# Patient Record
Sex: FEMALE | Race: WHITE | NOT HISPANIC OR LATINO | Employment: FULL TIME | URBAN - NONMETROPOLITAN AREA
[De-identification: names, ages, dates, MRNs, and addresses within clinical notes are randomized per-mention and may not be internally consistent; named-entity substitution may affect disease eponyms.]

---

## 2022-01-28 ENCOUNTER — OFFICE VISIT (OUTPATIENT)
Dept: CARDIOLOGY | Facility: CLINIC | Age: 52
End: 2022-01-28

## 2022-01-28 VITALS
HEIGHT: 67 IN | HEART RATE: 70 BPM | DIASTOLIC BLOOD PRESSURE: 80 MMHG | OXYGEN SATURATION: 97 % | BODY MASS INDEX: 28.09 KG/M2 | WEIGHT: 179 LBS | TEMPERATURE: 96.2 F | RESPIRATION RATE: 18 BRPM | SYSTOLIC BLOOD PRESSURE: 116 MMHG

## 2022-01-28 DIAGNOSIS — R07.89 CHEST PAIN, ATYPICAL: Primary | ICD-10-CM

## 2022-01-28 DIAGNOSIS — R01.1 HEART MURMUR: ICD-10-CM

## 2022-01-28 PROCEDURE — 93000 ELECTROCARDIOGRAM COMPLETE: CPT | Performed by: INTERNAL MEDICINE

## 2022-01-28 PROCEDURE — 99204 OFFICE O/P NEW MOD 45 MIN: CPT | Performed by: INTERNAL MEDICINE

## 2022-02-18 ENCOUNTER — OFFICE VISIT (OUTPATIENT)
Dept: CARDIOLOGY | Facility: CLINIC | Age: 52
End: 2022-02-18

## 2022-02-18 VITALS
WEIGHT: 173 LBS | OXYGEN SATURATION: 98 % | SYSTOLIC BLOOD PRESSURE: 104 MMHG | DIASTOLIC BLOOD PRESSURE: 70 MMHG | TEMPERATURE: 97.1 F | HEIGHT: 67 IN | HEART RATE: 65 BPM | RESPIRATION RATE: 15 BRPM | BODY MASS INDEX: 27.15 KG/M2

## 2022-02-18 DIAGNOSIS — R01.1 HEART MURMUR: ICD-10-CM

## 2022-02-18 DIAGNOSIS — R07.89 CHEST PAIN, ATYPICAL: Primary | ICD-10-CM

## 2022-02-18 PROCEDURE — 99213 OFFICE O/P EST LOW 20 MIN: CPT | Performed by: INTERNAL MEDICINE

## 2022-08-29 ENCOUNTER — TELEPHONE (OUTPATIENT)
Dept: FAMILY MEDICINE CLINIC | Facility: CLINIC | Age: 52
End: 2022-08-29

## 2022-09-01 ENCOUNTER — OFFICE VISIT (OUTPATIENT)
Dept: FAMILY MEDICINE CLINIC | Facility: CLINIC | Age: 52
End: 2022-09-01

## 2022-09-01 VITALS
OXYGEN SATURATION: 98 % | DIASTOLIC BLOOD PRESSURE: 78 MMHG | SYSTOLIC BLOOD PRESSURE: 120 MMHG | HEIGHT: 67 IN | RESPIRATION RATE: 15 BRPM | HEART RATE: 79 BPM | TEMPERATURE: 98 F | WEIGHT: 184.6 LBS | BODY MASS INDEX: 28.97 KG/M2

## 2022-09-01 DIAGNOSIS — D64.9 ANEMIA, UNSPECIFIED TYPE: ICD-10-CM

## 2022-09-01 DIAGNOSIS — Z12.11 SCREENING FOR COLON CANCER: ICD-10-CM

## 2022-09-01 DIAGNOSIS — Z79.899 HIGH RISK MEDICATION USE: ICD-10-CM

## 2022-09-01 DIAGNOSIS — E01.0 THYROMEGALY: ICD-10-CM

## 2022-09-01 DIAGNOSIS — R79.89 ELEVATED TSH: ICD-10-CM

## 2022-09-01 DIAGNOSIS — R53.83 FATIGUE, UNSPECIFIED TYPE: Primary | ICD-10-CM

## 2022-09-01 PROCEDURE — 99204 OFFICE O/P NEW MOD 45 MIN: CPT | Performed by: PHYSICIAN ASSISTANT

## 2022-09-01 RX ORDER — LEVOTHYROXINE SODIUM 137 UG/1
137 TABLET ORAL DAILY
COMMUNITY
Start: 2022-08-16 | End: 2022-10-06

## 2022-09-08 ENCOUNTER — LAB (OUTPATIENT)
Dept: FAMILY MEDICINE CLINIC | Facility: CLINIC | Age: 52
End: 2022-09-08

## 2022-09-08 DIAGNOSIS — R79.89 ELEVATED TSH: ICD-10-CM

## 2022-09-08 DIAGNOSIS — D64.9 ANEMIA, UNSPECIFIED TYPE: ICD-10-CM

## 2022-09-08 DIAGNOSIS — R53.83 FATIGUE, UNSPECIFIED TYPE: ICD-10-CM

## 2022-09-08 PROCEDURE — 36415 COLL VENOUS BLD VENIPUNCTURE: CPT | Performed by: PHYSICIAN ASSISTANT

## 2022-09-09 LAB
ALBUMIN SERPL-MCNC: 4.2 G/DL (ref 3.8–4.8)
ALBUMIN/GLOB SERPL: 1.8 {RATIO} (ref 1.2–2.2)
ALP SERPL-CCNC: 141 IU/L (ref 44–121)
ALT SERPL-CCNC: 21 IU/L (ref 0–32)
AST SERPL-CCNC: 19 IU/L (ref 0–40)
BASOPHILS # BLD AUTO: 0.1 X10E3/UL (ref 0–0.2)
BASOPHILS NFR BLD AUTO: 1 %
BILIRUB SERPL-MCNC: 0.3 MG/DL (ref 0–1.2)
BUN SERPL-MCNC: 20 MG/DL (ref 6–24)
BUN/CREAT SERPL: 27 (ref 9–23)
CALCIUM SERPL-MCNC: 9.7 MG/DL (ref 8.7–10.2)
CHLORIDE SERPL-SCNC: 104 MMOL/L (ref 96–106)
CO2 SERPL-SCNC: 23 MMOL/L (ref 20–29)
CREAT SERPL-MCNC: 0.73 MG/DL (ref 0.57–1)
EGFRCR-CYS SERPLBLD CKD-EPI 2021: 100 ML/MIN/1.73
EOSINOPHIL # BLD AUTO: 0.1 X10E3/UL (ref 0–0.4)
EOSINOPHIL NFR BLD AUTO: 2 %
ERYTHROCYTE [DISTWIDTH] IN BLOOD BY AUTOMATED COUNT: 12.9 % (ref 11.7–15.4)
FERRITIN SERPL-MCNC: 121 NG/ML (ref 15–150)
FOLATE SERPL-MCNC: 14.8 NG/ML
GLOBULIN SER CALC-MCNC: 2.3 G/DL (ref 1.5–4.5)
GLUCOSE SERPL-MCNC: 92 MG/DL (ref 65–99)
HCT VFR BLD AUTO: 36.5 % (ref 34–46.6)
HGB BLD-MCNC: 12.1 G/DL (ref 11.1–15.9)
IMM GRANULOCYTES # BLD AUTO: 0 X10E3/UL (ref 0–0.1)
IMM GRANULOCYTES NFR BLD AUTO: 0 %
IRON SATN MFR SERPL: 20 % (ref 15–55)
IRON SERPL-MCNC: 69 UG/DL (ref 27–159)
LYMPHOCYTES # BLD AUTO: 2.1 X10E3/UL (ref 0.7–3.1)
LYMPHOCYTES NFR BLD AUTO: 32 %
MAGNESIUM SERPL-MCNC: 2.2 MG/DL (ref 1.6–2.3)
MCH RBC QN AUTO: 29.6 PG (ref 26.6–33)
MCHC RBC AUTO-ENTMCNC: 33.2 G/DL (ref 31.5–35.7)
MCV RBC AUTO: 89 FL (ref 79–97)
MONOCYTES # BLD AUTO: 0.5 X10E3/UL (ref 0.1–0.9)
MONOCYTES NFR BLD AUTO: 8 %
NEUTROPHILS # BLD AUTO: 3.6 X10E3/UL (ref 1.4–7)
NEUTROPHILS NFR BLD AUTO: 57 %
PLATELET # BLD AUTO: 315 X10E3/UL (ref 150–450)
POTASSIUM SERPL-SCNC: 4.6 MMOL/L (ref 3.5–5.2)
PROT SERPL-MCNC: 6.5 G/DL (ref 6–8.5)
RBC # BLD AUTO: 4.09 X10E6/UL (ref 3.77–5.28)
SODIUM SERPL-SCNC: 142 MMOL/L (ref 134–144)
TIBC SERPL-MCNC: 342 UG/DL (ref 250–450)
UIBC SERPL-MCNC: 273 UG/DL (ref 131–425)
VIT B12 SERPL-MCNC: 448 PG/ML (ref 232–1245)
WBC # BLD AUTO: 6.4 X10E3/UL (ref 3.4–10.8)

## 2022-09-12 LAB
THYROGLOB AB SERPL-ACNC: <1 IU/ML (ref 0–0.9)
THYROPEROXIDASE AB SERPL-ACNC: 10 IU/ML (ref 0–34)

## 2022-10-06 ENCOUNTER — OFFICE VISIT (OUTPATIENT)
Dept: FAMILY MEDICINE CLINIC | Facility: CLINIC | Age: 52
End: 2022-10-06

## 2022-10-06 VITALS
HEIGHT: 67 IN | HEART RATE: 79 BPM | OXYGEN SATURATION: 99 % | DIASTOLIC BLOOD PRESSURE: 84 MMHG | SYSTOLIC BLOOD PRESSURE: 128 MMHG | BODY MASS INDEX: 28.94 KG/M2 | TEMPERATURE: 98 F | RESPIRATION RATE: 15 BRPM | WEIGHT: 184.4 LBS

## 2022-10-06 DIAGNOSIS — R53.83 OTHER FATIGUE: Primary | ICD-10-CM

## 2022-10-06 DIAGNOSIS — R63.5 WEIGHT GAIN: ICD-10-CM

## 2022-10-06 DIAGNOSIS — Z13.220 SCREENING FOR HYPERLIPIDEMIA: ICD-10-CM

## 2022-10-06 DIAGNOSIS — R79.89 ELEVATED TSH: ICD-10-CM

## 2022-10-06 PROCEDURE — 99214 OFFICE O/P EST MOD 30 MIN: CPT | Performed by: PHYSICIAN ASSISTANT

## 2022-10-06 RX ORDER — LEVOTHYROXINE SODIUM 0.05 MG/1
50 TABLET ORAL DAILY
Qty: 90 TABLET | Refills: 0 | Status: SHIPPED | OUTPATIENT
Start: 2022-10-06 | End: 2023-01-13 | Stop reason: SDUPTHER

## 2023-01-13 ENCOUNTER — OFFICE VISIT (OUTPATIENT)
Dept: FAMILY MEDICINE CLINIC | Facility: CLINIC | Age: 53
End: 2023-01-13
Payer: COMMERCIAL

## 2023-01-13 VITALS
BODY MASS INDEX: 26.85 KG/M2 | RESPIRATION RATE: 15 BRPM | TEMPERATURE: 98 F | HEIGHT: 67 IN | HEART RATE: 87 BPM | WEIGHT: 171.1 LBS | DIASTOLIC BLOOD PRESSURE: 88 MMHG | SYSTOLIC BLOOD PRESSURE: 118 MMHG | OXYGEN SATURATION: 98 %

## 2023-01-13 DIAGNOSIS — F33.42 RECURRENT MAJOR DEPRESSIVE DISORDER, IN FULL REMISSION: ICD-10-CM

## 2023-01-13 DIAGNOSIS — G47.62 NOCTURNAL LEG CRAMPS: ICD-10-CM

## 2023-01-13 DIAGNOSIS — E03.9 ACQUIRED HYPOTHYROIDISM: Primary | ICD-10-CM

## 2023-01-13 PROBLEM — M25.551 PAIN OF RIGHT HIP JOINT: Status: ACTIVE | Noted: 2022-03-15

## 2023-01-13 PROCEDURE — 99214 OFFICE O/P EST MOD 30 MIN: CPT | Performed by: PHYSICIAN ASSISTANT

## 2023-01-13 RX ORDER — LEVOTHYROXINE SODIUM 0.05 MG/1
50 TABLET ORAL DAILY
Qty: 90 TABLET | Refills: 1 | Status: SHIPPED | OUTPATIENT
Start: 2023-01-13

## 2023-01-13 RX ORDER — DICLOFENAC SODIUM 75 MG/1
75 TABLET, DELAYED RELEASE ORAL EVERY 12 HOURS PRN
COMMUNITY
Start: 2023-01-10 | End: 2023-01-13

## 2023-01-13 RX ORDER — FLUOXETINE HYDROCHLORIDE 20 MG/1
20 CAPSULE ORAL DAILY
Qty: 90 CAPSULE | Refills: 1 | Status: SHIPPED | OUTPATIENT
Start: 2023-01-13

## 2023-01-14 LAB
TSH SERPL DL<=0.005 MIU/L-ACNC: 1.6 UIU/ML (ref 0.45–4.5)
VIT B12 SERPL-MCNC: 405 PG/ML (ref 232–1245)

## 2023-07-20 PROBLEM — I10 BENIGN ESSENTIAL HTN: Status: ACTIVE | Noted: 2023-07-20

## 2023-07-20 PROBLEM — F41.8 ANXIETY WITH DEPRESSION: Status: ACTIVE | Noted: 2023-07-20

## 2023-07-20 PROBLEM — R25.2 LEG CRAMPS: Status: ACTIVE | Noted: 2023-07-20

## 2023-07-20 PROBLEM — E03.9 HYPOTHYROIDISM (ACQUIRED): Status: ACTIVE | Noted: 2023-07-20

## 2023-07-20 PROBLEM — Z13.21 ENCOUNTER FOR VITAMIN DEFICIENCY SCREENING: Status: ACTIVE | Noted: 2023-07-20

## 2023-08-09 ENCOUNTER — OFFICE VISIT (OUTPATIENT)
Dept: FAMILY MEDICINE CLINIC | Facility: CLINIC | Age: 53
End: 2023-08-09
Payer: COMMERCIAL

## 2023-08-09 VITALS
OXYGEN SATURATION: 97 % | SYSTOLIC BLOOD PRESSURE: 132 MMHG | HEIGHT: 67 IN | HEART RATE: 65 BPM | WEIGHT: 178 LBS | BODY MASS INDEX: 27.94 KG/M2 | DIASTOLIC BLOOD PRESSURE: 86 MMHG

## 2023-08-09 DIAGNOSIS — R82.90 NONSPECIFIC FINDING ON EXAMINATION OF URINE: ICD-10-CM

## 2023-08-09 DIAGNOSIS — R51.9 FREQUENT HEADACHES: ICD-10-CM

## 2023-08-09 DIAGNOSIS — D72.829 LEUKOCYTOSIS, UNSPECIFIED TYPE: ICD-10-CM

## 2023-08-09 DIAGNOSIS — E03.9 HYPOTHYROIDISM (ACQUIRED): ICD-10-CM

## 2023-08-09 DIAGNOSIS — E83.52 SERUM CALCIUM ELEVATED: ICD-10-CM

## 2023-08-09 DIAGNOSIS — R20.2 PARESTHESIA: ICD-10-CM

## 2023-08-09 DIAGNOSIS — K59.00 CONSTIPATION, UNSPECIFIED CONSTIPATION TYPE: Primary | ICD-10-CM

## 2023-08-09 LAB
BILIRUB BLD-MCNC: NEGATIVE MG/DL
CLARITY, POC: CLEAR
COLOR UR: YELLOW
EXPIRATION DATE: ABNORMAL
GLUCOSE UR STRIP-MCNC: NEGATIVE MG/DL
KETONES UR QL: NEGATIVE
LEUKOCYTE EST, POC: NEGATIVE
Lab: ABNORMAL
NITRITE UR-MCNC: NEGATIVE MG/ML
PH UR: 5.5 [PH] (ref 5–8)
PROT UR STRIP-MCNC: NEGATIVE MG/DL
RBC # UR STRIP: ABNORMAL /UL
SP GR UR: 1.02 (ref 1–1.03)
UROBILINOGEN UR QL: NORMAL

## 2023-08-09 RX ORDER — ONDANSETRON 4 MG/1
TABLET, ORALLY DISINTEGRATING ORAL
COMMUNITY

## 2023-08-10 LAB
ALBUMIN SERPL-MCNC: 4.4 G/DL (ref 3.8–4.9)
ALBUMIN/GLOB SERPL: 1.9 {RATIO} (ref 1.2–2.2)
ALP SERPL-CCNC: 96 IU/L (ref 44–121)
ALT SERPL-CCNC: 17 IU/L (ref 0–32)
AST SERPL-CCNC: 18 IU/L (ref 0–40)
BASOPHILS # BLD AUTO: 0.1 X10E3/UL (ref 0–0.2)
BASOPHILS NFR BLD AUTO: 1 %
BILIRUB SERPL-MCNC: 0.3 MG/DL (ref 0–1.2)
BUN SERPL-MCNC: 14 MG/DL (ref 6–24)
BUN/CREAT SERPL: 16 (ref 9–23)
CA-I SERPL ISE-MCNC: 5 MG/DL (ref 4.5–5.6)
CALCIUM SERPL-MCNC: 9.4 MG/DL (ref 8.7–10.2)
CHLORIDE SERPL-SCNC: 102 MMOL/L (ref 96–106)
CO2 SERPL-SCNC: 25 MMOL/L (ref 20–29)
CREAT SERPL-MCNC: 0.87 MG/DL (ref 0.57–1)
EGFRCR SERPLBLD CKD-EPI 2021: 81 ML/MIN/1.73
EOSINOPHIL # BLD AUTO: 0.1 X10E3/UL (ref 0–0.4)
EOSINOPHIL NFR BLD AUTO: 1 %
ERYTHROCYTE [DISTWIDTH] IN BLOOD BY AUTOMATED COUNT: 12.6 % (ref 11.7–15.4)
GLOBULIN SER CALC-MCNC: 2.3 G/DL (ref 1.5–4.5)
GLUCOSE SERPL-MCNC: 82 MG/DL (ref 70–99)
HCT VFR BLD AUTO: 36.2 % (ref 34–46.6)
HGB BLD-MCNC: 11.9 G/DL (ref 11.1–15.9)
IMM GRANULOCYTES # BLD AUTO: 0 X10E3/UL (ref 0–0.1)
IMM GRANULOCYTES NFR BLD AUTO: 0 %
LYMPHOCYTES # BLD AUTO: 2.3 X10E3/UL (ref 0.7–3.1)
LYMPHOCYTES NFR BLD AUTO: 32 %
MCH RBC QN AUTO: 29.3 PG (ref 26.6–33)
MCHC RBC AUTO-ENTMCNC: 32.9 G/DL (ref 31.5–35.7)
MCV RBC AUTO: 89 FL (ref 79–97)
MONOCYTES # BLD AUTO: 0.4 X10E3/UL (ref 0.1–0.9)
MONOCYTES NFR BLD AUTO: 6 %
NEUTROPHILS # BLD AUTO: 4.3 X10E3/UL (ref 1.4–7)
NEUTROPHILS NFR BLD AUTO: 60 %
PLATELET # BLD AUTO: 283 X10E3/UL (ref 150–450)
POTASSIUM SERPL-SCNC: 4.2 MMOL/L (ref 3.5–5.2)
PROT SERPL-MCNC: 6.7 G/DL (ref 6–8.5)
PTH-INTACT SERPL-MCNC: NORMAL PG/ML
RBC # BLD AUTO: 4.06 X10E6/UL (ref 3.77–5.28)
SODIUM SERPL-SCNC: 142 MMOL/L (ref 134–144)
SPECIMEN STATUS: NORMAL
TSH SERPL DL<=0.005 MIU/L-ACNC: 0.88 UIU/ML (ref 0.45–4.5)
VIT B12 SERPL-MCNC: 507 PG/ML (ref 232–1245)
WBC # BLD AUTO: 7.2 X10E3/UL (ref 3.4–10.8)

## 2023-08-11 LAB
BACTERIA UR CULT: NO GROWTH
BACTERIA UR CULT: NORMAL

## 2023-11-15 ENCOUNTER — OFFICE VISIT (OUTPATIENT)
Dept: NEUROLOGY | Facility: CLINIC | Age: 53
End: 2023-11-15
Payer: COMMERCIAL

## 2023-11-15 VITALS
SYSTOLIC BLOOD PRESSURE: 118 MMHG | WEIGHT: 177.8 LBS | OXYGEN SATURATION: 96 % | DIASTOLIC BLOOD PRESSURE: 68 MMHG | HEART RATE: 77 BPM | BODY MASS INDEX: 27.91 KG/M2 | HEIGHT: 67 IN

## 2023-11-15 DIAGNOSIS — G43.C0 PERIODIC HEADACHE SYNDROME, NOT INTRACTABLE: Primary | ICD-10-CM

## 2023-11-15 DIAGNOSIS — R20.2 PARESTHESIA: ICD-10-CM

## 2023-11-15 PROBLEM — R51.9 FREQUENT HEADACHES: Status: RESOLVED | Noted: 2023-08-09 | Resolved: 2023-11-15

## 2023-11-15 RX ORDER — AMITRIPTYLINE HYDROCHLORIDE 10 MG/1
10-20 TABLET, FILM COATED ORAL NIGHTLY
Qty: 60 TABLET | Refills: 5 | Status: SHIPPED | OUTPATIENT
Start: 2023-11-15

## 2023-12-29 ENCOUNTER — HOSPITAL ENCOUNTER (OUTPATIENT)
Dept: MRI IMAGING | Facility: HOSPITAL | Age: 53
Discharge: HOME | End: 2023-12-29
Admitting: PSYCHIATRY & NEUROLOGY
Payer: COMMERCIAL

## 2023-12-29 DIAGNOSIS — R20.2 PARESTHESIA: ICD-10-CM

## 2023-12-29 DIAGNOSIS — G43.C0 PERIODIC HEADACHE SYNDROME, NOT INTRACTABLE: ICD-10-CM

## 2023-12-29 PROCEDURE — 70553 MRI BRAIN STEM W/O & W/DYE: CPT

## 2023-12-29 PROCEDURE — 0 GADOBENATE DIMEGLUMINE 529 MG/ML SOLUTION: Performed by: PSYCHIATRY & NEUROLOGY

## 2023-12-29 PROCEDURE — A9577 INJ MULTIHANCE: HCPCS | Performed by: PSYCHIATRY & NEUROLOGY

## 2023-12-29 RX ADMIN — GADOBENATE DIMEGLUMINE 15 ML: 529 INJECTION, SOLUTION INTRAVENOUS at 09:36

## 2024-01-03 ENCOUNTER — TELEPHONE (OUTPATIENT)
Dept: NEUROLOGY | Facility: CLINIC | Age: 54
End: 2024-01-03
Payer: COMMERCIAL

## 2024-01-11 ENCOUNTER — OFFICE VISIT (OUTPATIENT)
Dept: FAMILY MEDICINE CLINIC | Facility: CLINIC | Age: 54
End: 2024-01-11
Payer: COMMERCIAL

## 2024-01-11 VITALS
DIASTOLIC BLOOD PRESSURE: 84 MMHG | HEIGHT: 67 IN | SYSTOLIC BLOOD PRESSURE: 122 MMHG | TEMPERATURE: 98.8 F | BODY MASS INDEX: 27.37 KG/M2 | HEART RATE: 76 BPM | OXYGEN SATURATION: 100 % | WEIGHT: 174.38 LBS

## 2024-01-11 DIAGNOSIS — J02.9 SORE THROAT: ICD-10-CM

## 2024-01-11 DIAGNOSIS — J01.00 ACUTE NON-RECURRENT MAXILLARY SINUSITIS: Primary | ICD-10-CM

## 2024-01-11 DIAGNOSIS — R05.1 ACUTE COUGH: ICD-10-CM

## 2024-01-11 PROBLEM — R05.9 COUGH: Status: ACTIVE | Noted: 2024-01-11

## 2024-01-11 LAB
EXPIRATION DATE: NORMAL
EXPIRATION DATE: NORMAL
FLUAV AG UPPER RESP QL IA.RAPID: NOT DETECTED
FLUBV AG UPPER RESP QL IA.RAPID: NOT DETECTED
INTERNAL CONTROL: NORMAL
INTERNAL CONTROL: NORMAL
Lab: NORMAL
Lab: NORMAL
S PYO AG THROAT QL: NEGATIVE
SARS-COV-2 AG UPPER RESP QL IA.RAPID: NOT DETECTED

## 2024-01-11 PROCEDURE — 99213 OFFICE O/P EST LOW 20 MIN: CPT | Performed by: NURSE PRACTITIONER

## 2024-01-11 RX ORDER — CEFDINIR 300 MG/1
300 CAPSULE ORAL 2 TIMES DAILY
Qty: 20 CAPSULE | Refills: 0 | Status: SHIPPED | OUTPATIENT
Start: 2024-01-11

## 2024-01-11 RX ORDER — ESTRADIOL 0.1 MG/G
CREAM VAGINAL
COMMUNITY
Start: 2023-12-02

## 2024-01-14 LAB
BACTERIA SPEC RESP CULT: NORMAL
BACTERIA SPEC RESP CULT: NORMAL

## 2024-01-18 DIAGNOSIS — E03.9 HYPOTHYROIDISM (ACQUIRED): ICD-10-CM

## 2024-01-18 RX ORDER — LEVOTHYROXINE SODIUM 0.05 MG/1
50 TABLET ORAL DAILY
Qty: 90 TABLET | Refills: 0 | Status: SHIPPED | OUTPATIENT
Start: 2024-01-18

## 2024-02-07 ENCOUNTER — OFFICE VISIT (OUTPATIENT)
Dept: FAMILY MEDICINE CLINIC | Facility: CLINIC | Age: 54
End: 2024-02-07
Payer: COMMERCIAL

## 2024-02-07 VITALS
WEIGHT: 181.44 LBS | SYSTOLIC BLOOD PRESSURE: 128 MMHG | HEIGHT: 67 IN | BODY MASS INDEX: 28.48 KG/M2 | OXYGEN SATURATION: 95 % | DIASTOLIC BLOOD PRESSURE: 82 MMHG | RESPIRATION RATE: 17 BRPM | HEART RATE: 71 BPM

## 2024-02-07 DIAGNOSIS — M54.2 NECK PAIN: Primary | ICD-10-CM

## 2024-02-07 PROCEDURE — 99214 OFFICE O/P EST MOD 30 MIN: CPT | Performed by: NURSE PRACTITIONER

## 2024-02-07 RX ORDER — DICLOFENAC SODIUM 75 MG/1
75 TABLET, DELAYED RELEASE ORAL 2 TIMES DAILY
Qty: 40 TABLET | Refills: 0 | Status: SHIPPED | OUTPATIENT
Start: 2024-02-07

## 2024-02-07 RX ORDER — TIZANIDINE 4 MG/1
4 TABLET ORAL EVERY 8 HOURS PRN
Qty: 30 TABLET | Refills: 0 | Status: SHIPPED | OUTPATIENT
Start: 2024-02-07

## 2024-03-25 ENCOUNTER — OFFICE VISIT (OUTPATIENT)
Dept: FAMILY MEDICINE CLINIC | Facility: CLINIC | Age: 54
End: 2024-03-25
Payer: COMMERCIAL

## 2024-03-25 ENCOUNTER — TELEPHONE (OUTPATIENT)
Dept: FAMILY MEDICINE CLINIC | Facility: CLINIC | Age: 54
End: 2024-03-25

## 2024-03-25 VITALS
HEIGHT: 67 IN | DIASTOLIC BLOOD PRESSURE: 84 MMHG | BODY MASS INDEX: 28.78 KG/M2 | WEIGHT: 183.38 LBS | HEART RATE: 69 BPM | SYSTOLIC BLOOD PRESSURE: 112 MMHG | OXYGEN SATURATION: 97 %

## 2024-03-25 DIAGNOSIS — Z11.59 NEED FOR HEPATITIS C SCREENING TEST: ICD-10-CM

## 2024-03-25 DIAGNOSIS — F41.8 ANXIETY WITH DEPRESSION: ICD-10-CM

## 2024-03-25 DIAGNOSIS — Z12.4 SCREENING FOR CERVICAL CANCER: ICD-10-CM

## 2024-03-25 DIAGNOSIS — Z12.11 SCREENING FOR COLON CANCER: ICD-10-CM

## 2024-03-25 DIAGNOSIS — R20.0 NUMBNESS AND TINGLING OF BOTH FEET: ICD-10-CM

## 2024-03-25 DIAGNOSIS — R20.2 NUMBNESS AND TINGLING OF BOTH FEET: ICD-10-CM

## 2024-03-25 DIAGNOSIS — Z13.220 SCREENING FOR HYPERLIPIDEMIA: ICD-10-CM

## 2024-03-25 DIAGNOSIS — I10 BENIGN ESSENTIAL HTN: ICD-10-CM

## 2024-03-25 DIAGNOSIS — G43.C0 PERIODIC HEADACHE SYNDROME, NOT INTRACTABLE: ICD-10-CM

## 2024-03-25 DIAGNOSIS — Z00.00 ANNUAL PHYSICAL EXAM: Primary | ICD-10-CM

## 2024-03-25 DIAGNOSIS — E03.9 HYPOTHYROIDISM (ACQUIRED): ICD-10-CM

## 2024-03-25 DIAGNOSIS — Z13.21 ENCOUNTER FOR VITAMIN DEFICIENCY SCREENING: ICD-10-CM

## 2024-03-25 DIAGNOSIS — Z12.31 ENCOUNTER FOR SCREENING MAMMOGRAM FOR MALIGNANT NEOPLASM OF BREAST: ICD-10-CM

## 2024-03-25 LAB
BILIRUB BLD-MCNC: NEGATIVE MG/DL
CLARITY, POC: CLEAR
COLOR UR: YELLOW
EXPIRATION DATE: NORMAL
GLUCOSE UR STRIP-MCNC: NEGATIVE MG/DL
KETONES UR QL: NEGATIVE
LEUKOCYTE EST, POC: NEGATIVE
Lab: NORMAL
NITRITE UR-MCNC: NEGATIVE MG/ML
PH UR: 5 [PH] (ref 5–8)
PROT UR STRIP-MCNC: NEGATIVE MG/DL
RBC # UR STRIP: NEGATIVE /UL
SP GR UR: 1.03 (ref 1–1.03)
UROBILINOGEN UR QL: NORMAL

## 2024-03-25 RX ORDER — LEVOTHYROXINE SODIUM 0.05 MG/1
50 TABLET ORAL DAILY
Qty: 90 TABLET | Refills: 1 | Status: SHIPPED | OUTPATIENT
Start: 2024-03-25

## 2024-03-25 RX ORDER — PREDNISONE 20 MG/1
TABLET ORAL
Qty: 18 TABLET | Refills: 0 | Status: SHIPPED | OUTPATIENT
Start: 2024-03-25 | End: 2024-04-03

## 2024-03-26 LAB
25(OH)D3+25(OH)D2 SERPL-MCNC: 35.8 NG/ML (ref 30–100)
ALBUMIN SERPL-MCNC: 4.2 G/DL (ref 3.8–4.9)
ALBUMIN/GLOB SERPL: 2 {RATIO} (ref 1.2–2.2)
ALP SERPL-CCNC: 130 IU/L (ref 44–121)
ALT SERPL-CCNC: 30 IU/L (ref 0–32)
AST SERPL-CCNC: 24 IU/L (ref 0–40)
BASOPHILS # BLD AUTO: 0.1 X10E3/UL (ref 0–0.2)
BASOPHILS NFR BLD AUTO: 1 %
BILIRUB SERPL-MCNC: 0.3 MG/DL (ref 0–1.2)
BUN SERPL-MCNC: 17 MG/DL (ref 6–24)
BUN/CREAT SERPL: 21 (ref 9–23)
CALCIUM SERPL-MCNC: 9.7 MG/DL (ref 8.7–10.2)
CHLORIDE SERPL-SCNC: 107 MMOL/L (ref 96–106)
CHOLEST SERPL-MCNC: 201 MG/DL (ref 100–199)
CO2 SERPL-SCNC: 24 MMOL/L (ref 20–29)
CREAT SERPL-MCNC: 0.8 MG/DL (ref 0.57–1)
EGFRCR SERPLBLD CKD-EPI 2021: 89 ML/MIN/1.73
EOSINOPHIL # BLD AUTO: 0.2 X10E3/UL (ref 0–0.4)
EOSINOPHIL NFR BLD AUTO: 3 %
ERYTHROCYTE [DISTWIDTH] IN BLOOD BY AUTOMATED COUNT: 12.8 % (ref 11.7–15.4)
FERRITIN SERPL-MCNC: 92 NG/ML (ref 15–150)
FOLATE SERPL-MCNC: >20 NG/ML
GLOBULIN SER CALC-MCNC: 2.1 G/DL (ref 1.5–4.5)
GLUCOSE SERPL-MCNC: 92 MG/DL (ref 70–99)
HBA1C MFR BLD: 5.6 % (ref 4.8–5.6)
HCT VFR BLD AUTO: 38 % (ref 34–46.6)
HCV IGG SERPL QL IA: REACTIVE
HDLC SERPL-MCNC: 61 MG/DL
HGB BLD-MCNC: 12.2 G/DL (ref 11.1–15.9)
IMM GRANULOCYTES # BLD AUTO: 0 X10E3/UL (ref 0–0.1)
IMM GRANULOCYTES NFR BLD AUTO: 0 %
IRON SERPL-MCNC: 58 UG/DL (ref 27–159)
LDLC SERPL CALC-MCNC: 113 MG/DL (ref 0–99)
LYMPHOCYTES # BLD AUTO: 2 X10E3/UL (ref 0.7–3.1)
LYMPHOCYTES NFR BLD AUTO: 32 %
MAGNESIUM SERPL-MCNC: 2.1 MG/DL (ref 1.6–2.3)
MCH RBC QN AUTO: 29.5 PG (ref 26.6–33)
MCHC RBC AUTO-ENTMCNC: 32.1 G/DL (ref 31.5–35.7)
MCV RBC AUTO: 92 FL (ref 79–97)
MONOCYTES # BLD AUTO: 0.4 X10E3/UL (ref 0.1–0.9)
MONOCYTES NFR BLD AUTO: 7 %
NEUTROPHILS # BLD AUTO: 3.7 X10E3/UL (ref 1.4–7)
NEUTROPHILS NFR BLD AUTO: 57 %
PLATELET # BLD AUTO: 272 X10E3/UL (ref 150–450)
POTASSIUM SERPL-SCNC: 4.2 MMOL/L (ref 3.5–5.2)
PROT SERPL-MCNC: 6.3 G/DL (ref 6–8.5)
RBC # BLD AUTO: 4.14 X10E6/UL (ref 3.77–5.28)
SODIUM SERPL-SCNC: 144 MMOL/L (ref 134–144)
TRIGL SERPL-MCNC: 155 MG/DL (ref 0–149)
TSH SERPL DL<=0.005 MIU/L-ACNC: 4.32 UIU/ML (ref 0.45–4.5)
VIT B12 SERPL-MCNC: 577 PG/ML (ref 232–1245)
VLDLC SERPL CALC-MCNC: 27 MG/DL (ref 5–40)
WBC # BLD AUTO: 6.4 X10E3/UL (ref 3.4–10.8)

## 2024-03-27 ENCOUNTER — TRANSCRIBE ORDERS (OUTPATIENT)
Dept: CT IMAGING | Facility: CLINIC | Age: 54
End: 2024-03-27
Payer: COMMERCIAL

## 2024-03-27 DIAGNOSIS — Z12.31 ENCOUNTER FOR SCREENING MAMMOGRAM FOR MALIGNANT NEOPLASM OF BREAST: Primary | ICD-10-CM

## 2024-03-28 DIAGNOSIS — R76.8 POSITIVE HEPATITIS C ANTIBODY TEST: Primary | ICD-10-CM

## 2024-03-28 DIAGNOSIS — R74.8 ELEVATED ALKALINE PHOSPHATASE LEVEL: ICD-10-CM

## 2024-03-30 LAB
HCV RNA SERPL NAA+PROBE-ACNC: NORMAL IU/ML
TEST INFORMATION: NORMAL

## 2024-04-03 ENCOUNTER — TELEPHONE (OUTPATIENT)
Dept: FAMILY MEDICINE CLINIC | Facility: CLINIC | Age: 54
End: 2024-04-03
Payer: COMMERCIAL

## 2024-04-12 ENCOUNTER — TELEPHONE (OUTPATIENT)
Dept: FAMILY MEDICINE CLINIC | Facility: CLINIC | Age: 54
End: 2024-04-12
Payer: COMMERCIAL

## 2024-04-15 ENCOUNTER — OFFICE VISIT (OUTPATIENT)
Dept: FAMILY MEDICINE CLINIC | Facility: CLINIC | Age: 54
End: 2024-04-15
Payer: COMMERCIAL

## 2024-04-15 VITALS
WEIGHT: 187.19 LBS | OXYGEN SATURATION: 97 % | HEIGHT: 67 IN | DIASTOLIC BLOOD PRESSURE: 78 MMHG | SYSTOLIC BLOOD PRESSURE: 110 MMHG | BODY MASS INDEX: 29.38 KG/M2 | HEART RATE: 75 BPM

## 2024-04-15 DIAGNOSIS — R20.2 NUMBNESS AND TINGLING OF BOTH FEET: Primary | ICD-10-CM

## 2024-04-15 DIAGNOSIS — R20.0 NUMBNESS AND TINGLING OF BOTH FEET: Primary | ICD-10-CM

## 2024-04-15 PROCEDURE — 99213 OFFICE O/P EST LOW 20 MIN: CPT | Performed by: NURSE PRACTITIONER

## 2024-06-14 ENCOUNTER — OFFICE VISIT (OUTPATIENT)
Dept: GASTROENTEROLOGY | Facility: CLINIC | Age: 54
End: 2024-06-14
Payer: COMMERCIAL

## 2024-06-14 VITALS
BODY MASS INDEX: 29.03 KG/M2 | DIASTOLIC BLOOD PRESSURE: 72 MMHG | OXYGEN SATURATION: 98 % | SYSTOLIC BLOOD PRESSURE: 120 MMHG | HEART RATE: 73 BPM | HEIGHT: 67 IN | WEIGHT: 185 LBS | TEMPERATURE: 97.1 F

## 2024-06-14 DIAGNOSIS — R76.8 HEPATITIS C ANTIBODY TEST POSITIVE: Primary | ICD-10-CM

## 2024-06-14 RX ORDER — NAPROXEN 500 MG/1
500 TABLET ORAL 2 TIMES DAILY WITH MEALS
COMMUNITY

## 2024-08-27 DIAGNOSIS — E03.9 HYPOTHYROIDISM (ACQUIRED): ICD-10-CM

## 2024-08-27 RX ORDER — LEVOTHYROXINE SODIUM 50 UG/1
50 TABLET ORAL DAILY
Qty: 90 TABLET | Refills: 0 | Status: SHIPPED | OUTPATIENT
Start: 2024-08-27

## 2024-09-25 ENCOUNTER — TELEPHONE (OUTPATIENT)
Dept: FAMILY MEDICINE CLINIC | Facility: CLINIC | Age: 54
End: 2024-09-25

## 2024-09-25 ENCOUNTER — OFFICE VISIT (OUTPATIENT)
Dept: FAMILY MEDICINE CLINIC | Facility: CLINIC | Age: 54
End: 2024-09-25
Payer: COMMERCIAL

## 2024-09-25 VITALS
OXYGEN SATURATION: 97 % | HEIGHT: 67 IN | HEART RATE: 75 BPM | DIASTOLIC BLOOD PRESSURE: 80 MMHG | WEIGHT: 186.06 LBS | BODY MASS INDEX: 29.2 KG/M2 | SYSTOLIC BLOOD PRESSURE: 132 MMHG

## 2024-09-25 DIAGNOSIS — Z79.899 ENCOUNTER FOR LONG-TERM (CURRENT) USE OF OTHER MEDICATIONS: ICD-10-CM

## 2024-09-25 DIAGNOSIS — F41.8 ANXIETY WITH DEPRESSION: ICD-10-CM

## 2024-09-25 DIAGNOSIS — E78.2 MIXED HYPERLIPIDEMIA: ICD-10-CM

## 2024-09-25 DIAGNOSIS — E03.9 HYPOTHYROIDISM (ACQUIRED): Primary | ICD-10-CM

## 2024-09-25 DIAGNOSIS — Z12.31 ENCOUNTER FOR SCREENING MAMMOGRAM FOR MALIGNANT NEOPLASM OF BREAST: ICD-10-CM

## 2024-09-25 PROCEDURE — 99214 OFFICE O/P EST MOD 30 MIN: CPT | Performed by: NURSE PRACTITIONER

## 2024-09-25 RX ORDER — LEVOTHYROXINE SODIUM 50 UG/1
50 TABLET ORAL DAILY
Qty: 90 TABLET | Refills: 1 | Status: SHIPPED | OUTPATIENT
Start: 2024-09-25 | End: 2024-09-30

## 2024-09-26 DIAGNOSIS — Z12.31 ENCOUNTER FOR SCREENING MAMMOGRAM FOR MALIGNANT NEOPLASM OF BREAST: ICD-10-CM

## 2024-09-26 LAB
ALBUMIN SERPL-MCNC: 4.1 G/DL (ref 3.8–4.9)
ALP SERPL-CCNC: 147 IU/L (ref 44–121)
ALT SERPL-CCNC: 33 IU/L (ref 0–32)
AST SERPL-CCNC: 30 IU/L (ref 0–40)
BILIRUB SERPL-MCNC: <0.2 MG/DL (ref 0–1.2)
BUN SERPL-MCNC: 20 MG/DL (ref 6–24)
BUN/CREAT SERPL: 24 (ref 9–23)
CALCIUM SERPL-MCNC: 9.2 MG/DL (ref 8.7–10.2)
CHLORIDE SERPL-SCNC: 102 MMOL/L (ref 96–106)
CHOLEST SERPL-MCNC: 219 MG/DL (ref 100–199)
CO2 SERPL-SCNC: 24 MMOL/L (ref 20–29)
CREAT SERPL-MCNC: 0.84 MG/DL (ref 0.57–1)
EGFRCR SERPLBLD CKD-EPI 2021: 84 ML/MIN/1.73
GLOBULIN SER CALC-MCNC: 2.2 G/DL (ref 1.5–4.5)
GLUCOSE SERPL-MCNC: 86 MG/DL (ref 70–99)
HDLC SERPL-MCNC: 46 MG/DL
LDLC SERPL CALC-MCNC: 95 MG/DL (ref 0–99)
POTASSIUM SERPL-SCNC: 4.1 MMOL/L (ref 3.5–5.2)
PROT SERPL-MCNC: 6.3 G/DL (ref 6–8.5)
SODIUM SERPL-SCNC: 141 MMOL/L (ref 134–144)
T4 FREE SERPL-MCNC: 1.09 NG/DL (ref 0.82–1.77)
TRIGL SERPL-MCNC: 473 MG/DL (ref 0–149)
TSH SERPL DL<=0.005 MIU/L-ACNC: 0.21 UIU/ML (ref 0.45–4.5)
VLDLC SERPL CALC-MCNC: 78 MG/DL (ref 5–40)

## 2024-09-30 RX ORDER — LEVOTHYROXINE SODIUM 25 UG/1
25 TABLET ORAL
Qty: 30 TABLET | Refills: 1 | Status: SHIPPED | OUTPATIENT
Start: 2024-09-30

## 2024-11-13 DIAGNOSIS — R79.89 LOW TSH LEVEL: ICD-10-CM

## 2024-11-13 DIAGNOSIS — R74.8 ELEVATED LIVER ENZYMES: Primary | ICD-10-CM

## 2024-11-14 ENCOUNTER — LAB (OUTPATIENT)
Dept: FAMILY MEDICINE CLINIC | Facility: CLINIC | Age: 54
End: 2024-11-14
Payer: COMMERCIAL

## 2024-11-15 LAB
ALBUMIN SERPL-MCNC: 4.4 G/DL (ref 3.8–4.9)
ALP SERPL-CCNC: 120 IU/L (ref 44–121)
ALT SERPL-CCNC: 18 IU/L (ref 0–32)
AST SERPL-CCNC: 23 IU/L (ref 0–40)
BILIRUB SERPL-MCNC: 0.4 MG/DL (ref 0–1.2)
BUN SERPL-MCNC: 19 MG/DL (ref 6–24)
BUN/CREAT SERPL: 25 (ref 9–23)
CALCIUM SERPL-MCNC: 9.6 MG/DL (ref 8.7–10.2)
CHLORIDE SERPL-SCNC: 103 MMOL/L (ref 96–106)
CO2 SERPL-SCNC: 28 MMOL/L (ref 20–29)
CREAT SERPL-MCNC: 0.75 MG/DL (ref 0.57–1)
EGFRCR SERPLBLD CKD-EPI 2021: 96 ML/MIN/1.73
GLOBULIN SER CALC-MCNC: 2.2 G/DL (ref 1.5–4.5)
GLUCOSE SERPL-MCNC: 95 MG/DL (ref 70–99)
POTASSIUM SERPL-SCNC: 4.7 MMOL/L (ref 3.5–5.2)
PROT SERPL-MCNC: 6.6 G/DL (ref 6–8.5)
SODIUM SERPL-SCNC: 143 MMOL/L (ref 134–144)
TSH SERPL DL<=0.005 MIU/L-ACNC: 3.87 UIU/ML (ref 0.45–4.5)

## 2024-11-19 ENCOUNTER — TELEPHONE (OUTPATIENT)
Dept: FAMILY MEDICINE CLINIC | Facility: CLINIC | Age: 54
End: 2024-11-19
Payer: COMMERCIAL

## 2024-11-27 RX ORDER — LEVOTHYROXINE SODIUM 25 UG/1
25 TABLET ORAL EVERY MORNING
Qty: 30 TABLET | Refills: 0 | Status: SHIPPED | OUTPATIENT
Start: 2024-11-27

## 2024-12-03 ENCOUNTER — TRANSCRIBE ORDERS (OUTPATIENT)
Dept: CT IMAGING | Facility: CLINIC | Age: 54
End: 2024-12-03
Payer: COMMERCIAL

## 2024-12-03 DIAGNOSIS — Z12.31 ENCOUNTER FOR SCREENING MAMMOGRAM FOR MALIGNANT NEOPLASM OF BREAST: Primary | ICD-10-CM

## 2024-12-26 RX ORDER — LEVOTHYROXINE SODIUM 25 UG/1
25 TABLET ORAL EVERY MORNING
Qty: 30 TABLET | Refills: 0 | Status: SHIPPED | OUTPATIENT
Start: 2024-12-26

## 2025-02-18 RX ORDER — LEVOTHYROXINE SODIUM 25 UG/1
25 TABLET ORAL EVERY MORNING
Qty: 30 TABLET | Refills: 0 | Status: SHIPPED | OUTPATIENT
Start: 2025-02-18

## 2025-04-03 ENCOUNTER — OFFICE VISIT (OUTPATIENT)
Dept: FAMILY MEDICINE CLINIC | Facility: CLINIC | Age: 55
End: 2025-04-03
Payer: COMMERCIAL

## 2025-04-03 ENCOUNTER — TELEPHONE (OUTPATIENT)
Dept: CARDIOLOGY | Facility: CLINIC | Age: 55
End: 2025-04-03
Payer: COMMERCIAL

## 2025-04-03 VITALS
BODY MASS INDEX: 29.18 KG/M2 | RESPIRATION RATE: 18 BRPM | WEIGHT: 185.9 LBS | HEIGHT: 67 IN | SYSTOLIC BLOOD PRESSURE: 134 MMHG | OXYGEN SATURATION: 97 % | HEART RATE: 66 BPM | DIASTOLIC BLOOD PRESSURE: 86 MMHG

## 2025-04-03 DIAGNOSIS — R07.9 CHEST PAIN, UNSPECIFIED TYPE: Primary | ICD-10-CM

## 2025-04-03 DIAGNOSIS — E87.0 HYPERNATREMIA: ICD-10-CM

## 2025-04-03 DIAGNOSIS — R21 RASH: ICD-10-CM

## 2025-04-03 DIAGNOSIS — D64.9 ANEMIA, UNSPECIFIED TYPE: ICD-10-CM

## 2025-04-03 DIAGNOSIS — E78.2 MIXED HYPERLIPIDEMIA: ICD-10-CM

## 2025-04-03 DIAGNOSIS — R31.9 HEMATURIA, UNSPECIFIED TYPE: ICD-10-CM

## 2025-04-03 DIAGNOSIS — M54.6 THORACIC SPINE PAIN: ICD-10-CM

## 2025-04-03 DIAGNOSIS — E03.9 HYPOTHYROIDISM (ACQUIRED): ICD-10-CM

## 2025-04-03 DIAGNOSIS — I10 BENIGN ESSENTIAL HTN: ICD-10-CM

## 2025-04-03 LAB
BILIRUB BLD-MCNC: NEGATIVE MG/DL
CLARITY, POC: CLEAR
COLOR UR: YELLOW
EXPIRATION DATE: ABNORMAL
GLUCOSE UR STRIP-MCNC: NEGATIVE MG/DL
KETONES UR QL: NEGATIVE
LEUKOCYTE EST, POC: NEGATIVE
Lab: ABNORMAL
NITRITE UR-MCNC: NEGATIVE MG/ML
PH UR: 5.5 [PH] (ref 5–8)
PROT UR STRIP-MCNC: NEGATIVE MG/DL
RBC # UR STRIP: ABNORMAL /UL
SP GR UR: 1.02 (ref 1–1.03)
UROBILINOGEN UR QL: ABNORMAL

## 2025-04-03 PROCEDURE — 99214 OFFICE O/P EST MOD 30 MIN: CPT | Performed by: NURSE PRACTITIONER

## 2025-04-03 PROCEDURE — 81003 URINALYSIS AUTO W/O SCOPE: CPT | Performed by: NURSE PRACTITIONER

## 2025-04-03 RX ORDER — LEVOTHYROXINE SODIUM 25 UG/1
25 TABLET ORAL EVERY MORNING
Qty: 30 TABLET | Refills: 5 | Status: SHIPPED | OUTPATIENT
Start: 2025-04-03

## 2025-04-03 RX ORDER — PREDNISONE 20 MG/1
TABLET ORAL
Qty: 18 TABLET | Refills: 0 | Status: SHIPPED | OUTPATIENT
Start: 2025-04-03 | End: 2025-04-12

## 2025-04-03 RX ORDER — VALACYCLOVIR HYDROCHLORIDE 1 G/1
1000 TABLET, FILM COATED ORAL 3 TIMES DAILY
Qty: 21 TABLET | Refills: 0 | Status: SHIPPED | OUTPATIENT
Start: 2025-04-03

## 2025-04-03 NOTE — ASSESSMENT & PLAN NOTE
X-ray completed.  Will let know if radiologist sees anything.  Due to location of pain and tender to palpation possibly related to a musculoskeletal issue.  Will treat with a round of prednisone.  Avoid NSAIDs while on prednisone.  Deep breaths encouraged.  Rest and ice in 15-minute intervals encouraged.  Return in 2 to 3 days if no improvement, sooner if worsens.  Risk of meds discussed and understood.  Return to clinic or ED with any issues or concerns.

## 2025-04-03 NOTE — PROGRESS NOTES
Chief Complaint  ER FOLLOW UP    Subjective          Michelle Perez presents to [facility]  History of Present Illness  History of Present Illness  The patient is a 55-year-old female who went to [facility] on 03/31/2025 due to dull aching chest pain that she had been experiencing for 3 days. The pain was located in the center of her chest and worsened when lying down. She occasionally experienced sharp, stabbing pains and at times, the pain radiated to her left arm. She reports no dizziness, headache, or lightheadedness. She has a history of hypertension and noted that her blood pressure had been elevated. She also has a history of hypothyroidism. She states she is doing well on her medications.     She initially sought care at an urgent care facility due to persistent back pain, which she suspected might be pleurisy. However, she was advised to seek emergency care. Upon arrival at the hospital, her blood pressure was recorded as 180/117. She has no known cardiac history. She recalls a previous echocardiogram revealing a minor valve leak, but she was reassured that it was not a cause for concern. She underwent EKGs, and the repeat EKG showed normal sinus rhythm with no acute ischemic changes or STEMI. Initial and repeat troponin levels were not elevated or significantly different. A negative D-dimer test led to the conclusion that ACS or PE was unlikely. A chest x-ray showed no acute abnormality (per pt. Report), and there were no significant lab abnormalities. Her symptoms improved with Pepcid and a stomach cocktail, leading to the possibility that the symptoms were GERD-related. She was discharged with omeprazole 20 mg twice daily and sucralfate.    She has not observed any hematochezia or melena. She is currently taking Prozac and thyroid medication without any missed doses.    She states she is continuing to have the pain.  She speculates that her current  "symptoms may be related to shingles, given the presence of a rash that is pruritic and mildly burning that has presented to her upper left lateral side. The onset of her symptoms was on Saturday, initially attributed to a pulled muscle from heavy lifting at work. Despite taking a muscle relaxer, she experienced insomnia and difficulty lying flat. Her symptoms have not improved and are exacerbated by lying down, necessitating a seated position for comfort.  She states the pain mainly occurs more so upper left side of her chest and upper left side of back near shoulder blade area.  She states she does not believe it is cardiac related and believes it is due to either a strained muscle or possibly this new rash that has occurred on her left side.  No chest pressure no shortness of breath no trouble breathing no urinary or bowel issues.  No swelling.  No altered mental status no confusion.  No headaches no dizziness.  No vision issues.    FAMILY HISTORY  Her father  from a heart issue, and her mother also had heart issues. All her aunts have heart issues.    MEDICATIONS  Current: Prozac, thyroid medicine, omeprazole, sucralfate    Patient Care Team:  Av Escamilla APRN as PCP - General (Nurse Practitioner)     Objective   Vital Signs:   /86   Pulse 66   Resp 18   Ht 170.2 cm (67.01\")   Wt 84.3 kg (185 lb 14.4 oz)   SpO2 97%   BMI 29.11 kg/m²     Body mass index is 29.11 kg/m².    Review of Systems   Constitutional:  Negative for chills, fatigue and fever.   HENT:  Negative for congestion.    Eyes:  Negative for visual disturbance.   Respiratory:  Negative for cough, chest tightness, shortness of breath and wheezing.    Cardiovascular:  Positive for chest pain. Negative for palpitations and leg swelling.   Gastrointestinal:  Negative for abdominal distention, abdominal pain, blood in stool, constipation, diarrhea, nausea, vomiting, GERD and indigestion.   Genitourinary:  Negative for decreased " urine volume, dysuria, frequency, hematuria and urgency.   Musculoskeletal:  Negative for back pain.   Skin:  Negative for rash, skin lesions and wound.   Neurological:  Negative for dizziness, weakness, light-headedness, numbness and headache.   Psychiatric/Behavioral:  Negative for suicidal ideas.        Past History:  Medical History: has a past medical history of Abdominal pain, Adjustment disorder with depressed mood, COVID-19 (03/2022), COVID-19 (12/2020), COVID-19 (07/2022), DDD (degenerative disc disease), cervical, Difficulty walking, Disc displacement, lumbar, HPV (human papilloma virus) infection, Migraine, Mixed hyperlipidemia (9/25/2024), and Tonsillitis.   Surgical History: has a past surgical history that includes Tonsillectomy (2005); Total abdominal hysterectomy; Back surgery; Cervical fusion (2009); and Appendectomy (2004).   Family History: family history includes Alcohol abuse in her mother; Cervical cancer in her sister; Coronary artery disease in an other family member; Dementia in her brother; Depression in her mother and sister; Diabetes in her brother and paternal grandmother; Hypertension in her father; Irritable bowel syndrome in her mother; Other in her sister; Stroke in her mother.   Social History: reports that she has never smoked. She has never been exposed to tobacco smoke. She has never used smokeless tobacco. She reports that she does not currently use alcohol. She reports that she does not use drugs.    PHQ-2 Depression Screening  Little interest or pleasure in doing things? Several days   Feeling down, depressed, or hopeless? Not at all   PHQ-2 Total Score 1       PHQ-9 Depression Screening  Little interest or pleasure in doing things? Several days   Feeling down, depressed, or hopeless? Not at all   PHQ-2 Total Score 1   Trouble falling or staying asleep, or sleeping too much?     Feeling tired or having little energy?     Poor appetite or overeating?     Feeling bad about  yourself - or that you are a failure or have let yourself or your family down?     Trouble concentrating on things, such as reading the newspaper or watching television?     Moving or speaking so slowly that other people could have noticed? Or the opposite - being so fidgety or restless that you have been moving around a lot more than usual?     Thoughts that you would be better off dead, or of hurting yourself in some way?     PHQ-9 Total Score     If you checked off any problems, how difficult have these problems made it for you to do your work, take care of things at home, or get along with other people? Not difficult at all        PHQ-9 Total Score:        Patient screened positive for depression based on a PHQ-9 score of  on . Follow-up recommendations include:          Current Outpatient Medications:     B Complex Vitamins (vitamin b complex) capsule capsule, Take  by mouth Daily. OTC, Disp: , Rfl:     FLUoxetine (PROzac) 20 MG capsule, Take 1 capsule by mouth Daily., Disp: 90 capsule, Rfl: 1    levothyroxine (SYNTHROID, LEVOTHROID) 25 MCG tablet, Take 1 tablet by mouth Every Morning., Disp: 30 tablet, Rfl: 5    predniSONE (DELTASONE) 20 MG tablet, Take 3 tablets by mouth Daily for 3 days, THEN 2 tablets Daily for 3 days, THEN 1 tablet Daily for 3 days., Disp: 18 tablet, Rfl: 0    valACYclovir (Valtrex) 1000 MG tablet, Take 1 tablet by mouth 3 (Three) Times a Day., Disp: 21 tablet, Rfl: 0   (Not in a hospital admission)     Allergies: Hydrocodone-acetaminophen and Wellbutrin [bupropion]    Physical Exam  Constitutional:       Appearance: Normal appearance.   Eyes:      Conjunctiva/sclera: Conjunctivae normal.      Pupils: Pupils are equal, round, and reactive to light.   Cardiovascular:      Rate and Rhythm: Normal rate and regular rhythm.      Heart sounds: Normal heart sounds.   Pulmonary:      Effort: Pulmonary effort is normal. No respiratory distress.      Breath sounds: Normal breath sounds. No wheezing,  rhonchi or rales.   Abdominal:      General: Abdomen is flat. Bowel sounds are normal. There is no distension.      Palpations: Abdomen is soft.      Tenderness: There is no abdominal tenderness. There is no right CVA tenderness, left CVA tenderness, guarding or rebound.   Musculoskeletal:      Left shoulder: Normal.      Cervical back: Normal.      Thoracic back: Tenderness present.        Back:       Right lower leg: No edema.      Left lower leg: No edema.      Comments: Patient states left area near shoulder blade as marked above slightly tender to palpation.   Skin:     General: Skin is warm.      Findings: Rash present.      Comments: Erythematous type linear rash present to upper left lateral side.  Slightly tender.  Patient states the rash itches and burns.  No open skin no discharge.   Neurological:      General: No focal deficit present.      Mental Status: She is alert and oriented to person, place, and time. Mental status is at baseline.   Psychiatric:         Mood and Affect: Mood normal.         Behavior: Behavior normal.         Thought Content: Thought content normal.         Judgment: Judgment normal.        Physical Exam  Throat was examined.  Lungs were auscultated.  Heart sounds were normal.  Rash was present.    Result Review :          Results  Laboratory Studies  Initial troponin was not elevated. Repeat troponin not elevated or significantly different. Negative D-dimer. Sodium was mildly elevated at 144. Red blood cell count low at 3.98. Hemoglobin and hematocrit were low at 11.6 and 35.5 respectively.    Testing  Repeat EKG showed normal sinus rhythm with no acute ischemic changes. No STEMI.             Assessment and Plan    Diagnoses and all orders for this visit:    1. Chest pain, unspecified type (Primary)  -     Basic Metabolic Panel  -     CBC & Differential  -     POC Urinalysis Dipstick, Automated  -     Ambulatory Referral to Cardiology    2. Benign essential HTN    3. Thoracic  spine pain  Assessment & Plan:  X-ray completed.  Will let know if radiologist sees anything.  Due to location of pain and tender to palpation possibly related to a musculoskeletal issue.  Will treat with a round of prednisone.  Avoid NSAIDs while on prednisone.  Deep breaths encouraged.  Rest and ice in 15-minute intervals encouraged.  Return in 2 to 3 days if no improvement, sooner if worsens.  Risk of meds discussed and understood.  Return to clinic or ED with any issues or concerns.    Orders:  -     predniSONE (DELTASONE) 20 MG tablet; Take 3 tablets by mouth Daily for 3 days, THEN 2 tablets Daily for 3 days, THEN 1 tablet Daily for 3 days.  Dispense: 18 tablet; Refill: 0  -     XR Spine Thoracic 2 View (In Office)    4. Rash  -     predniSONE (DELTASONE) 20 MG tablet; Take 3 tablets by mouth Daily for 3 days, THEN 2 tablets Daily for 3 days, THEN 1 tablet Daily for 3 days.  Dispense: 18 tablet; Refill: 0  -     valACYclovir (Valtrex) 1000 MG tablet; Take 1 tablet by mouth 3 (Three) Times a Day.  Dispense: 21 tablet; Refill: 0    5. Hypernatremia  -     Basic Metabolic Panel    6. Anemia, unspecified type  -     CBC & Differential  -     POC Urinalysis Dipstick, Automated    7. Mixed hyperlipidemia  -     Lipid Panel    8. Hypothyroidism (acquired)  -     TSH Rfx On Abnormal To Free T4  -     levothyroxine (SYNTHROID, LEVOTHROID) 25 MCG tablet; Take 1 tablet by mouth Every Morning.  Dispense: 30 tablet; Refill: 5    9. Hematuria, unspecified type  -     Cancel: Urine Culture - Urine, Urine, Clean Catch; Future  -     Urine Culture - Urine, Urine, Clean Catch      Assessment & Plan  1. Chest pain.  The chest pain could potentially be attributed to shingles or a cardiac issue that may not have been detected on the EKG. A referral to the cardiology team in [place] will be made for further cardiac evaluation, including an echocardiogram.  Labs drawn.  Education provided.  Deep breaths encouraged.  Informed her if  anything worsens that she needs to immediately go to the hospital.  Return to clinic or ED with any issues or concerns.    2. Suspected shingles.  The rash appears linear, suggesting it could be shingles contributing to the chest pain. An antiviral medication and steroids will be prescribed to manage the potential shingles and reduce inflammation.  Education provided on how to prevent spread.  Return in 2 days if no improvement, sooner if worsens.  Risk of meds discussed and understood.    3. Hypertension.  She has a history of hypertension and reported elevated blood pressure during her recent ER visit. Blood pressure will be monitored, and current medications will be reviewed.  Goal blood pressure less than 140/90.  Completed and shows blood.  Culture sent.  Call in 2 days for results.  Return in 1 to 2 weeks for repeat UA to make sure course.  UA let me know if gets to or above that.  Cardiology referral placed.    4. Hypothyroidism.  Continue levothyroxine. Thyroid levels will be rechecked today to determine if any adjustments are needed in her medication.    5. Anemia.  Her red blood cell count, hemoglobin, and hematocrit were low during her recent hospital visit. Blood counts will be rechecked today to monitor her anemia.  Patient states no source of blood loss.    6. Elevated sodium levels.  Her sodium levels were mildly elevated, possibly due to dehydration. Hydration status will be assessed, and sodium levels will be rechecked.  Labs drawn.  Stay hydrated with water.  Education provided.    PROCEDURE  The patient has a history of total hysterectomy.                    Follow Up   Return in about 3 months (around 7/3/2025), or if symptoms worsen or fail to improve, for Annual physical.  Patient was given instructions and counseling regarding her condition or for health maintenance advice. Please see specific information pulled into the AVS if appropriate.     Patient or patient representative verbalized  consent for the use of Ambient Listening during the visit with  HIPOLITO Benitez for chart documentation. 4/3/2025  12:14 EDT    HIPOLITO Benitez

## 2025-04-04 ENCOUNTER — OFFICE VISIT (OUTPATIENT)
Dept: CARDIOLOGY | Facility: CLINIC | Age: 55
End: 2025-04-04
Payer: COMMERCIAL

## 2025-04-04 VITALS
HEART RATE: 58 BPM | SYSTOLIC BLOOD PRESSURE: 132 MMHG | RESPIRATION RATE: 18 BRPM | BODY MASS INDEX: 29.13 KG/M2 | OXYGEN SATURATION: 99 % | WEIGHT: 185.6 LBS | DIASTOLIC BLOOD PRESSURE: 90 MMHG | HEIGHT: 67 IN

## 2025-04-04 DIAGNOSIS — R06.09 DYSPNEA ON EXERTION: ICD-10-CM

## 2025-04-04 DIAGNOSIS — I20.89 ANGINA DECUBITUS: Primary | ICD-10-CM

## 2025-04-04 DIAGNOSIS — E78.2 MIXED HYPERLIPIDEMIA: ICD-10-CM

## 2025-04-04 DIAGNOSIS — I10 BENIGN ESSENTIAL HTN: ICD-10-CM

## 2025-04-04 LAB
BASOPHILS # BLD AUTO: 0.1 X10E3/UL (ref 0–0.2)
BASOPHILS NFR BLD AUTO: 1 %
BUN SERPL-MCNC: 18 MG/DL (ref 6–24)
BUN/CREAT SERPL: 21 (ref 9–23)
CALCIUM SERPL-MCNC: 9.7 MG/DL (ref 8.7–10.2)
CHLORIDE SERPL-SCNC: 104 MMOL/L (ref 96–106)
CHOLEST SERPL-MCNC: 218 MG/DL (ref 100–199)
CO2 SERPL-SCNC: 25 MMOL/L (ref 20–29)
CREAT SERPL-MCNC: 0.84 MG/DL (ref 0.57–1)
EGFRCR SERPLBLD CKD-EPI 2021: 83 ML/MIN/1.73
EOSINOPHIL # BLD AUTO: 0.3 X10E3/UL (ref 0–0.4)
EOSINOPHIL NFR BLD AUTO: 5 %
ERYTHROCYTE [DISTWIDTH] IN BLOOD BY AUTOMATED COUNT: 12.6 % (ref 11.7–15.4)
GLUCOSE SERPL-MCNC: 97 MG/DL (ref 70–99)
HCT VFR BLD AUTO: 38.4 % (ref 34–46.6)
HDLC SERPL-MCNC: 56 MG/DL
HGB BLD-MCNC: 12.4 G/DL (ref 11.1–15.9)
IMM GRANULOCYTES # BLD AUTO: 0 X10E3/UL (ref 0–0.1)
IMM GRANULOCYTES NFR BLD AUTO: 0 %
LDLC SERPL CALC-MCNC: 124 MG/DL (ref 0–99)
LYMPHOCYTES # BLD AUTO: 2 X10E3/UL (ref 0.7–3.1)
LYMPHOCYTES NFR BLD AUTO: 35 %
MCH RBC QN AUTO: 29.2 PG (ref 26.6–33)
MCHC RBC AUTO-ENTMCNC: 32.3 G/DL (ref 31.5–35.7)
MCV RBC AUTO: 91 FL (ref 79–97)
MONOCYTES # BLD AUTO: 0.5 X10E3/UL (ref 0.1–0.9)
MONOCYTES NFR BLD AUTO: 9 %
NEUTROPHILS # BLD AUTO: 2.9 X10E3/UL (ref 1.4–7)
NEUTROPHILS NFR BLD AUTO: 50 %
PLATELET # BLD AUTO: 295 X10E3/UL (ref 150–450)
POTASSIUM SERPL-SCNC: 4.1 MMOL/L (ref 3.5–5.2)
RBC # BLD AUTO: 4.24 X10E6/UL (ref 3.77–5.28)
SODIUM SERPL-SCNC: 143 MMOL/L (ref 134–144)
T4 FREE SERPL-MCNC: 0.82 NG/DL (ref 0.82–1.77)
TRIGL SERPL-MCNC: 216 MG/DL (ref 0–149)
TSH SERPL DL<=0.005 MIU/L-ACNC: 5.54 UIU/ML (ref 0.45–4.5)
VLDLC SERPL CALC-MCNC: 38 MG/DL (ref 5–40)
WBC # BLD AUTO: 5.7 X10E3/UL (ref 3.4–10.8)

## 2025-04-04 PROCEDURE — 99204 OFFICE O/P NEW MOD 45 MIN: CPT | Performed by: INTERNAL MEDICINE

## 2025-04-04 RX ORDER — LOSARTAN POTASSIUM 25 MG/1
25 TABLET ORAL DAILY
Qty: 90 TABLET | Refills: 1 | Status: SHIPPED | OUTPATIENT
Start: 2025-04-04

## 2025-04-04 NOTE — ASSESSMENT & PLAN NOTE
"With angina lying down, improved sitting up.  EKG and troponin negative, inpatient workup.  Differential includes: Uncontrolled hypertension, coronary artery disease, CHF, noncardiac chest pain.  Chest pain associated with worsening dyspnea on exertion. Plan:  Treadmill nuclear stress test to assess exercise capacity and high pretest probability of CAD  Transthoracic echocardiogram for assessment of shortness of breath, indeterminate NT-proBNP ER visit  Risk factors modification as per respective problem list \"benign essential hypertension\", \"mixed hyperlipidemia\"  "

## 2025-04-04 NOTE — ASSESSMENT & PLAN NOTE
Hypertension is uncontrolled.  Start losartan 25 mg p.o. daily.  Dietary sodium restriction.  Weight loss.  Regular aerobic exercise.  Ambulatory blood pressure monitoring.  Blood pressure will be reassessedin 3 months in office, BP check in 4 weeks.

## 2025-04-04 NOTE — PROGRESS NOTES
[facility] [place]  [facility] CARDIOLOGY  [address] [place] [address]  Dept: [phone]  Dept Fax: [phone]    Date: 04/04/2025  Patient: Michelle Perez  YOB: 1970    New Patient Office Note    Consult Reason:  Ms. Michelle Perez is a 55 y.o. female who presents to the clinic to establish care, seen for Chest Pain.   Patient chest pain different than the one she had 2 years ago.  Currently pain nonexertional, near constant, worse lying down, improved sitting up.  ER visit with normal EKG and negative troponins.  Blood pressure poorly controlled for the last 1-2 weeks.  Patient also complaining of dyspnea on exertion currently mild-moderate intensity daily activities, new to patient.  Family history of heart disease, father mother and sister, not specified.  Patient denies  orthopnea,  PND, palpitations,  lightheadedness,  syncope, medications side-effects.    The following portions of the patient's history were reviewed and updated as appropriate: allergies, current medications, past family history, past medical history, past social history, past surgical history, and problem list.    Medications:   Allergies   Allergen Reactions    Hydrocodone-Acetaminophen Hives, Itching, Swelling and Unknown - Low Severity    Wellbutrin [Bupropion] Hives      Current Outpatient Medications   Medication Instructions    B Complex Vitamins (vitamin b complex) capsule capsule Daily    FLUoxetine (PROZAC) 20 mg, Oral, Daily    levothyroxine (SYNTHROID, LEVOTHROID) 25 mcg, Oral, Every Morning    losartan (COZAAR) 25 mg, Oral, Daily    predniSONE (DELTASONE) 20 MG tablet Take 3 tablets by mouth Daily for 3 days, THEN 2 tablets Daily for 3 days, THEN 1 tablet Daily for 3 days.    valACYclovir (VALTREX) 1,000 mg, Oral, 3 Times Daily       Subjective  Past Medical History:   Diagnosis Date    Abdominal pain     UNCERTAIN CAUSE    Adjustment disorder with depressed mood     COVID-19 03/2022  "   COVID-19 12/2020    COVID-19 07/2022    DDD (degenerative disc disease), cervical     SPINE-C4 C5    Difficulty walking     Disc displacement, lumbar     WITH L5 RADICULITIS    HPV (human papilloma virus) infection     Migraine     Mixed hyperlipidemia 9/25/2024    Tonsillitis        Past Surgical History:   Procedure Laterality Date    APPENDECTOMY  2004    BACK SURGERY      CERVICAL FUSION  2009    TONSILLECTOMY  2005    TOTAL ABDOMINAL HYSTERECTOMY      BSO       Family History   Problem Relation Age of Onset    Depression Mother     Stroke Mother     Alcohol abuse Mother     Irritable bowel syndrome Mother     Hypertension Father     Cervical cancer Sister     Other Sister         DDD- NECK AND BACK    Depression Sister     Diabetes Brother     Dementia Brother     Diabetes Paternal Grandmother     Coronary artery disease Other     Colon polyps Neg Hx     Colon cancer Neg Hx     Esophageal cancer Neg Hx         Social History     Socioeconomic History    Marital status: Single   Tobacco Use    Smoking status: Never     Passive exposure: Never    Smokeless tobacco: Never   Vaping Use    Vaping status: Never Used   Substance and Sexual Activity    Alcohol use: Not Currently     Comment: patient rarly drinks    Drug use: Never    Sexual activity: Not Currently     Partners: Male     Birth control/protection: Hysterectomy       Objective  Vitals:    04/04/25 0926   BP: 132/90   Pulse: 58   Resp: 18   SpO2: 99%   Weight: 84.2 kg (185 lb 9.6 oz)   Height: 170.2 cm (67.01\")   PainSc: 0-No pain     Vitals:    04/04/25 0926   BP: 132/90   Pulse: 58   Resp: 18   SpO2: 99%   Weight: 84.2 kg (185 lb 9.6 oz)   Height: 170.2 cm (67.01\")        Physical Exam  Constitutional:       Appearance: Healthy appearance. Not in distress.   Eyes:      Pupils: Pupils are equal, round, and reactive to light.   HENT:    Mouth/Throat:      Mouth: Mucous membranes are moist.   Neck:      Vascular: No carotid bruit, hepatojugular reflux, " "JVD or JVR. JVD normal.   Pulmonary:      Effort: Pulmonary effort is normal.      Breath sounds: Normal breath sounds. No wheezing. No rhonchi. No rales.   Chest:      Chest wall: Not tender to palpatation.   Cardiovascular:      PMI at left midclavicular line. Normal rate. Regular rhythm. Normal S1 with normal intensity. Normal S2 with normal intensity.       Murmurs: There is no murmur.      No gallop.  No click. No rub.   Pulses:     Intact distal pulses.   Edema:     Peripheral edema absent.   Abdominal:      General: There is no abdominal bruit.   Skin:     General: Skin is warm.   Neurological:      Mental Status: Alert and oriented to person, place and time.              Labs:  Lab Results   Component Value Date     04/03/2025    K 4.1 04/03/2025     04/03/2025    CO2 25 04/03/2025    MG 2.1 03/25/2024    BUN 18 04/03/2025    CREATININE 0.84 04/03/2025    CALCIUM 9.7 04/03/2025    BILITOT 0.4 11/14/2024    ALKPHOS 120 11/14/2024    ALT 18 11/14/2024    AST 23 11/14/2024    GLUCOSE 97 04/03/2025    ALBUMIN 4.4 11/14/2024     Lab Results   Component Value Date    WBC 5.7 04/03/2025    HGB 12.4 04/03/2025    HCT 38.4 04/03/2025     04/03/2025     No results found for: \"APTT\", \"INR\", \"PTT\"  No results found for: \"CKTOTAL\", \"TROPONINI\", \"TROPONINT\", \"CKMBINDEX\", \"BNP\"  No results found for: \"BNP\", \"PROBNP\"    Lab Results   Component Value Date    CHLPL 218 (H) 04/03/2025    TRIG 216 (H) 04/03/2025    HDL 56 04/03/2025     (H) 04/03/2025     Lab Results   Component Value Date    TSH 5.540 (H) 04/03/2025    FREET4 0.82 04/03/2025       The 10-year ASCVD risk score (Devan LOWE, et al., 2019) is: 2.5%    Values used to calculate the score:      Age: 55 years      Sex: Female      Is Non- : No      Diabetic: No      Tobacco smoker: No      Systolic Blood Pressure: 132 mmHg      Is BP treated: Yes      HDL Cholesterol: 56 mg/dL      Total Cholesterol: 218 mg/dL     CV " Diagnostics:  Procedures  EKG reviewed from 3/31/2025: Normal sinus rhythm, lead reversal V2 V3  CXR: No results found for this or any previous visit.     ECHO/MUGA: No results found for this or any previous visit.     STRESS TESTS: Results for orders placed in visit on 02/16/22    Adult Stress Echo W/ Cont or Stress Agent if Necessary Per Protocol    Interpretation Summary  · Estimated left ventricular EF = 75% Estimated left ventricular EF was in agreement with the calculated left ventricular EF. Left ventricular ejection fraction appears to be 56 - 60%. Left ventricular systolic function is hyperdynamic (EF > 70%).  · Left ventricular diastolic function was normal.  · Estimated right ventricular systolic pressure from tricuspid regurgitation is normal (<35 mmHg).  · Left ventricular mass index is increased.  · Normal valves  · Patient walked on the treadmill for 9 minutes 21 second, reaching 10.3 METS  · Normal stress treadmill echocardiogram for ischemia  · Patient has some atypical chest pain at peak exertion, without EKG changes( Shoulder)     CARDIAC CATH: No results found for this or any previous visit.     DEVICES: No valid procedures specified.   HOLTER: No results found for this or any previous visit.     CT/MRI:  No results found for this or any previous visit.    VASCULAR: No valid procedures specified.     Assessment and Plan  Diagnoses and all orders for this visit:    1. Angina decubitus (Primary)  Assessment & Plan:  With angina lying down, improved sitting up.  EKG and troponin negative, inpatient workup.  Differential includes: Uncontrolled hypertension, coronary artery disease, CHF, noncardiac chest pain.  Chest pain associated with worsening dyspnea on exertion. Plan:  Treadmill nuclear stress test to assess exercise capacity and high pretest probability of CAD  Transthoracic echocardiogram for assessment of shortness of breath, indeterminate NT-proBNP ER visit  Risk factors modification as per  "respective problem list \"benign essential hypertension\", \"mixed hyperlipidemia\"    Orders:  -     Stress Test With Myocardial Perfusion One Day; Future    2. Dyspnea on exertion  Assessment & Plan:  Dyspnea on mild to moderate intensity daily activities.  NT proBNP indeterminate range.  Blood pressure poorly controlled. Plan:  Start losartan 25 mg p.o. daily  Transthoracic echocardiogram  If persistence of shortness of breath with aggressive blood pressure control and negative workup, consider pulmonary referral    Orders:  -     Adult Transthoracic Echo Complete W/ Cont if Necessary Per Protocol; Future    3. Benign essential HTN  Assessment & Plan:  Hypertension is uncontrolled.  Start losartan 25 mg p.o. daily.  Dietary sodium restriction.  Weight loss.  Regular aerobic exercise.  Ambulatory blood pressure monitoring.  Blood pressure will be reassessedin 3 months in office, BP check in 4 weeks.      4. Mixed hyperlipidemia  Assessment & Plan:   Lipid abnormalities are newly identified  ASCVD Score: 2.5%    Plan:  Lipid lowering therapy not prescribed due to low ASCVD score.    Discussed medication dosage, use, side effects, and goals of treatment in detail.    Counseled patient on lifestyle modifications to help control hyperlipidemia.   Cholesterol lowering dietary information shared with patient.  Advised patient to exercise for 150 minutes weekly. (30 minute brisk walk, 5 days a week for example)  Weight Loss encouraged  Patient counseled in regards to heart healthy, low fat/ low cholesterol/low sat diet, daily exercise for 30 minutes, low to moderate intensity, and weight loss.    Lab Results   Component Value Date    CHLPL 218 (H) 04/03/2025    CHLPL 219 (H) 09/25/2024    CHLPL 201 (H) 03/25/2024    TRIG 216 (H) 04/03/2025    TRIG 473 (H) 09/25/2024    TRIG 155 (H) 03/25/2024    HDL 56 04/03/2025    HDL 46 09/25/2024    HDL 61 03/25/2024     (H) 04/03/2025    LDL 95 09/25/2024     (H) " 03/25/2024     Goal of therapy: LDL  mg/dL      Followup in 3 months.      Other orders  -     losartan (COZAAR) 25 MG tablet; Take 1 tablet by mouth Daily.  Dispense: 90 tablet; Refill: 1         Return in about 3 months (around 7/4/2025) for Follow-up with Dr Henderson.    Patient Instructions   [facility] [place]  [facility] CARDIOLOGY  [address] [place] [address]  Dept: [phone]  Dept Fax: [phone]    Patient: Michelle Perez  YOB: 1970    Time and date of the stress test : ___________________________________  Your test will be at:  [facility] cardiology  [address] [place] [address] [place] [address] [phone]    Patient instructions:    Please refrain from smoking 2 - 4 hours prior to your test.    If your appointment is in the morning, do not eat breakfast.  Drink plenty of water or juice do not take diabetic medications    If your appointment is after 1230, eat a light breakfast before 9 AM.  Continue drinking water or juice and a light breakfast, but absolutely no coffee tea or soft drinks.    Drink plenty of juice or water the day of your test.  Absolutely NO coffee, tea, soft drinks, or chocolate    If you are on a beta-blocker, please hold the day before and the day of your test.  A list of some beta-blockers includes atenolol, Bystolic, bisoprolol, carvedilol, Coreg, Lopressor, metoprolol, Toprol-XL, sotalol.  Also hold some calcium channel blockers that can slow down the heart, such as Cardizem, diltiazem, verapamil.    Do not take Viagra, Cialis, or Levitra 48-hour before your test    If you test includes the treadmill, please wear comfortable clothing and exercise shoes    It is not necessary to bring anyone with you.  However if you do bring someone they are unable to go back with you during testing.  They are welcome to wait in the lobby.    Please bring a list of your current medications    Please bring your insurance cards and the picture ID  with you.    Please arrive 15 minutes before your appointment.    This test will last between 2 and 2-1/2 hours     If you must cancel, please give me 24-hour notice before your appointment.    MD MITRA Parham [facility] [place]  [facility] CARDIOLOGY  [address] [place] [address]  Dept: [phone]  Dept Fax: [phone]    Date:   Patient: Michelle Perez    Blood Pressure Log  Goal blood Pressure <130/80          Provided By: Darrius Henderson MD    Date Blood Pressure Heart Rate Comments   Saturday April 5, 2025       Giovanny April 6, 2025       Monday April 7, 2025       Tuesday April 8, 2025       Wednesday April 9, 2025       Thursday April 10, 2025       Friday April 11, 2025       Saturday April 12, 2025       Giovanny April 13, 2025       Monday April 14, 2025       Tuesday April 15, 2025       Wednesday April 16, 2025       Thursday April 17, 2025       Friday April 18, 2025       Saturday April 19, 2025       Giovanny April 20, 2025       Monday April 21, 2025       Tuesday April 22, 2025       Wednesday April 23, 2025       Thursday April 24, 2025       Friday April 25, 2025       Saturday April 26, 2025       Giovanny April 27, 2025       Monday April 28, 2025       Tuesday April 29, 2025       Wednesday April 30, 2025       Thursday May 1, 2025       Friday May 2, 2025       Saturday May 3, 2025       Giovanny May 4, 2025       Monday May 5, 2025       Tuesday May 6, 2025       Wednesday May 7, 2025       Thursday May 8, 2025       Friday May 9, 2025       Saturday May 10, 2025       Giovanny May 11, 2025       Monday May 12, 2025       Tuesday May 13, 2025       Wednesday May 14, 2025       Thursday May 15, 2025       Friday May 16, 2025       Saturday May 17, 2025       Giovanny May 18, 2025       Monday May 19, 2025       Tuesday May 20, 2025       Wednesday May 21, 2025       Thursday May 22, 2025       Friday May 23, 2025       Saturday May 24, 2025       Giovanny May 25, 2025        Monday May 26, 2025       Tuesday May 27, 2025       Wednesday May 28, 2025       Thursday May 29, 2025       Friday May 30, 2025       Saturday May 31, 2025       Giovanny June 1, 2025       Monday June 2, 2025            Darrius Henderson MD

## 2025-04-04 NOTE — PATIENT INSTRUCTIONS
[facility] [place]  [facility] CARDIOLOGY  [facility] [address] [place] [address]  Dept: [phone]  Dept Fax: [phone]    Patient: Michelle Perez  YOB: 1970    Time and date of the stress test : ___________________________________  Your test will be at:  [address] [place] [address]  [phone]    Patient instructions:    Please refrain from smoking 2 - 4 hours prior to your test.    If your appointment is in the morning, do not eat breakfast.  Drink plenty of water or juice do not take diabetic medications    If your appointment is after 1230, eat a light breakfast before 9 AM.  Continue drinking water or juice and a light breakfast, but absolutely no coffee tea or soft drinks.    Drink plenty of juice or water the day of your test.  Absolutely NO coffee, tea, soft drinks, or chocolate    If you are on a beta-blocker, please hold the day before and the day of your test.  A list of some beta-blockers includes atenolol, Bystolic, bisoprolol, carvedilol, Coreg, Lopressor, metoprolol, Toprol-XL, sotalol.  Also hold some calcium channel blockers that can slow down the heart, such as Cardizem, diltiazem, verapamil.    Do not take Viagra, Cialis, or Levitra 48-hour before your test    If you test includes the treadmill, please wear comfortable clothing and exercise shoes    It is not necessary to bring anyone with you.  However if you do bring someone they are unable to go back with you during testing.  They are welcome to wait in the lobby.    Please bring a list of your current medications    Please bring your insurance cards and the picture ID with you.    Please arrive 15 minutes before your appointment.    This test will last between 2 and 2-1/2 hours     If you must cancel, please give me 24-hour notice before your appointment.    MD MITRA Parham  [facility] CARDIOLOGY  [facility] [address] [place]  [place] [address]  Dept: [phone]  Dept Fax: [phone]    Date:   Patient: Michelle Perez    Blood Pressure Log  Goal blood Pressure <130/80          Provided By: Darrius Henderson MD    Date Blood Pressure Heart Rate Comments   Saturday April 5, 2025       Giovanny April 6, 2025       Monday April 7, 2025       Tuesday April 8, 2025       Wednesday April 9, 2025       Thursday April 10, 2025       Friday April 11, 2025       Saturday April 12, 2025       Giovanny April 13, 2025       Monday April 14, 2025       Tuesday April 15, 2025       Wednesday April 16, 2025       Thursday April 17, 2025       Friday April 18, 2025       Saturday April 19, 2025       Giovanny April 20, 2025       Monday April 21, 2025       Tuesday April 22, 2025       Wednesday April 23, 2025       Thursday April 24, 2025       Friday April 25, 2025       Saturday April 26, 2025       Giovanny April 27, 2025       Monday April 28, 2025       Tuesday April 29, 2025       Wednesday April 30, 2025       Thursday May 1, 2025       Friday May 2, 2025       Saturday May 3, 2025       Giovanny May 4, 2025       Monday May 5, 2025       Tuesday May 6, 2025       Wednesday May 7, 2025       Thursday May 8, 2025       Friday May 9, 2025       Saturday May 10, 2025       Giovanny May 11, 2025       Monday May 12, 2025       Tuesday May 13, 2025       Wednesday May 14, 2025       Thursday May 15, 2025       Friday May 16, 2025       Saturday May 17, 2025       Giovanny May 18, 2025       Monday May 19, 2025       Tuesday May 20, 2025       Wednesday May 21, 2025       Thursday May 22, 2025       Friday May 23, 2025       Saturday May 24, 2025       Giovanny May 25, 2025       Monday May 26, 2025       Tuesday May 27, 2025       Wednesday May 28, 2025       Thursday May 29, 2025       Friday May 30, 2025       Saturday May 31, 2025       Giovanny June 1, 2025       Monday June 2, 2025

## 2025-04-04 NOTE — ASSESSMENT & PLAN NOTE
Dyspnea on mild to moderate intensity daily activities.  NT proBNP indeterminate range.  Blood pressure poorly controlled. Plan:  Start losartan 25 mg p.o. daily  Transthoracic echocardiogram  If persistence of shortness of breath with aggressive blood pressure control and negative workup, consider pulmonary referral

## 2025-04-04 NOTE — ASSESSMENT & PLAN NOTE
Lipid abnormalities are newly identified  ASCVD Score: 2.5%    Plan:  Lipid lowering therapy not prescribed due to low ASCVD score.    Discussed medication dosage, use, side effects, and goals of treatment in detail.    Counseled patient on lifestyle modifications to help control hyperlipidemia.   Cholesterol lowering dietary information shared with patient.  Advised patient to exercise for 150 minutes weekly. (30 minute brisk walk, 5 days a week for example)  Weight Loss encouraged  Patient counseled in regards to heart healthy, low fat/ low cholesterol/low sat diet, daily exercise for 30 minutes, low to moderate intensity, and weight loss.    Lab Results   Component Value Date    CHLPL 218 (H) 04/03/2025    CHLPL 219 (H) 09/25/2024    CHLPL 201 (H) 03/25/2024    TRIG 216 (H) 04/03/2025    TRIG 473 (H) 09/25/2024    TRIG 155 (H) 03/25/2024    HDL 56 04/03/2025    HDL 46 09/25/2024    HDL 61 03/25/2024     (H) 04/03/2025    LDL 95 09/25/2024     (H) 03/25/2024     Goal of therapy: LDL  mg/dL      Followup in 3 months.

## 2025-04-05 LAB
BACTERIA UR CULT: NORMAL
BACTERIA UR CULT: NORMAL

## 2025-04-08 ENCOUNTER — TELEPHONE (OUTPATIENT)
Dept: CARDIOLOGY | Facility: CLINIC | Age: 55
End: 2025-04-08

## 2025-04-08 NOTE — TELEPHONE ENCOUNTER
PER ESPERANZA, PT WAS RESCHEDULED DUE TO [facility], FROM 4/10 TO 4/9, AND LEFT VOICEMAIL. [facility] CAN RELAY MESSAGE AND TRANSFER CALL TO THE OFFICE FOR CONFIRMATION.

## 2025-04-10 ENCOUNTER — RESULTS FOLLOW-UP (OUTPATIENT)
Dept: CARDIOLOGY | Facility: CLINIC | Age: 55
End: 2025-04-10
Payer: COMMERCIAL

## 2025-04-28 ENCOUNTER — TELEPHONE (OUTPATIENT)
Dept: CARDIOLOGY | Facility: CLINIC | Age: 55
End: 2025-04-28
Payer: COMMERCIAL

## 2025-05-05 ENCOUNTER — TELEPHONE (OUTPATIENT)
Dept: CARDIOLOGY | Facility: CLINIC | Age: 55
End: 2025-05-05
Payer: COMMERCIAL

## 2025-05-05 NOTE — TELEPHONE ENCOUNTER
LVM FOR PT TO CALL BACK AND RESCHEDULE FROM 7/3/25    [facility] CAN SCHEDULE EARLIEST AVAILABLE APPOINTMENT

## 2025-05-20 ENCOUNTER — CLINICAL SUPPORT (OUTPATIENT)
Dept: CARDIOLOGY | Facility: CLINIC | Age: 55
End: 2025-05-20
Payer: COMMERCIAL

## 2025-05-20 DIAGNOSIS — R06.09 DYSPNEA ON EXERTION: ICD-10-CM

## 2025-05-20 DIAGNOSIS — I10 BENIGN ESSENTIAL HTN: Primary | ICD-10-CM

## 2025-05-21 LAB
ALBUMIN SERPL-MCNC: 4.1 G/DL (ref 3.8–4.9)
ALP SERPL-CCNC: 111 IU/L (ref 44–121)
ALT SERPL-CCNC: 11 IU/L (ref 0–32)
AST SERPL-CCNC: 12 IU/L (ref 0–40)
BILIRUB SERPL-MCNC: 0.3 MG/DL (ref 0–1.2)
BUN SERPL-MCNC: 25 MG/DL (ref 6–24)
BUN/CREAT SERPL: 30 (ref 9–23)
CALCIUM SERPL-MCNC: 9.2 MG/DL (ref 8.7–10.2)
CHLORIDE SERPL-SCNC: 107 MMOL/L (ref 96–106)
CO2 SERPL-SCNC: 23 MMOL/L (ref 20–29)
CREAT SERPL-MCNC: 0.82 MG/DL (ref 0.57–1)
EGFRCR SERPLBLD CKD-EPI 2021: 85 ML/MIN/1.73
GLOBULIN SER CALC-MCNC: 2 G/DL (ref 1.5–4.5)
GLUCOSE SERPL-MCNC: 95 MG/DL (ref 70–99)
Lab: NORMAL
MAGNESIUM SERPL-MCNC: 2.2 MG/DL (ref 1.6–2.3)
MAGNESIUM SERPL-MCNC: 2.2 MG/DL (ref 1.6–2.3)
POTASSIUM SERPL-SCNC: 4.4 MMOL/L (ref 3.5–5.2)
PROT SERPL-MCNC: 6.1 G/DL (ref 6–8.5)
SODIUM SERPL-SCNC: 143 MMOL/L (ref 134–144)

## 2025-05-22 ENCOUNTER — RESULTS FOLLOW-UP (OUTPATIENT)
Dept: CARDIOLOGY | Facility: CLINIC | Age: 55
End: 2025-05-22
Payer: COMMERCIAL

## 2025-05-23 NOTE — TELEPHONE ENCOUNTER
Call placed to patient to discuss lab results. No answer, left voicemail requesting call back.   OK for [facility] to relay-  Please inform patient that her Blood work was normal.   Thank you!

## 2025-06-03 NOTE — PROGRESS NOTES
Venipuncture Blood Specimen Collection  Venipuncture performed in clinic by Nisha Torres MA with good hemostasis. Patient tolerated the procedure well without complications.   06/03/25   Nisha Torres MA

## 2025-06-11 ENCOUNTER — OFFICE VISIT (OUTPATIENT)
Dept: FAMILY MEDICINE CLINIC | Facility: CLINIC | Age: 55
End: 2025-06-11
Payer: COMMERCIAL

## 2025-06-11 VITALS
DIASTOLIC BLOOD PRESSURE: 68 MMHG | HEART RATE: 81 BPM | SYSTOLIC BLOOD PRESSURE: 102 MMHG | OXYGEN SATURATION: 94 % | HEIGHT: 67 IN | WEIGHT: 183.6 LBS | BODY MASS INDEX: 28.82 KG/M2 | TEMPERATURE: 99.7 F

## 2025-06-11 DIAGNOSIS — R21 RASH: Primary | ICD-10-CM

## 2025-06-11 PROCEDURE — 99214 OFFICE O/P EST MOD 30 MIN: CPT | Performed by: NURSE PRACTITIONER

## 2025-06-11 RX ORDER — CEPHALEXIN 500 MG/1
500 CAPSULE ORAL 3 TIMES DAILY
Qty: 21 CAPSULE | Refills: 0 | Status: SHIPPED | OUTPATIENT
Start: 2025-06-11 | End: 2025-06-18

## 2025-06-11 RX ORDER — TRIAMCINOLONE ACETONIDE 1 MG/G
1 CREAM TOPICAL 2 TIMES DAILY
Qty: 30 G | Refills: 0 | Status: SHIPPED | OUTPATIENT
Start: 2025-06-11

## 2025-06-11 RX ORDER — PREDNISONE 20 MG/1
TABLET ORAL
Qty: 18 TABLET | Refills: 0 | Status: SHIPPED | OUTPATIENT
Start: 2025-06-11

## 2025-06-11 NOTE — PROGRESS NOTES
"Chief Complaint  Upper Extremity Issue (Pt complains of swelling in the left wrist onset last night. )    Subjective          Michelle Perez presents to [facility]  History of Present Illness  Pt pulled weeds yesterday and noticed itching in  her left wrist last night. She had swelling, redness, and warmth at the site today.   Upper Extremity Issue  Symptoms include rash.    Pertinent negative symptoms include no chest pain, no fatigue and no fever.       History of Present Illness      Objective   Vital Signs:   /68   Pulse 81   Temp 99.7 °F (37.6 °C) (Oral)   Ht 170.2 cm (67\")   Wt 83.3 kg (183 lb 9.6 oz)   SpO2 94%   BMI 28.76 kg/m²     Body mass index is 28.76 kg/m².    Review of Systems   Constitutional:  Negative for fatigue and fever.   Respiratory:  Negative for shortness of breath.    Cardiovascular:  Negative for chest pain, palpitations and leg swelling.   Skin:  Positive for rash.   Neurological:  Negative for syncope.   Psychiatric/Behavioral:  The patient is not nervous/anxious.           Current Outpatient Medications:     B Complex Vitamins (vitamin b complex) capsule capsule, Take  by mouth Daily. OTC, Disp: , Rfl:     FLUoxetine (PROzac) 20 MG capsule, Take 1 capsule by mouth Daily., Disp: 90 capsule, Rfl: 1    levothyroxine (SYNTHROID, LEVOTHROID) 25 MCG tablet, Take 1 tablet by mouth Every Morning., Disp: 30 tablet, Rfl: 5    losartan (COZAAR) 25 MG tablet, Take 1 tablet by mouth Daily., Disp: 90 tablet, Rfl: 1    valACYclovir (Valtrex) 1000 MG tablet, Take 1 tablet by mouth 3 (Three) Times a Day., Disp: 21 tablet, Rfl: 0    cephalexin (KEFLEX) 500 MG capsule, Take 1 capsule by mouth 3 (Three) Times a Day for 7 days., Disp: 21 capsule, Rfl: 0    predniSONE (DELTASONE) 20 MG tablet, 3 QD x 3 days, 2 QD x 3 days, 1 QD x 3 days, Disp: 18 tablet, Rfl: 0    triamcinolone (KENALOG) 0.1 % cream, Apply 1 Application topically to the appropriate area as directed 2 " (Two) Times a Day., Disp: 30 g, Rfl: 0      Allergies: Hydrocodone-acetaminophen and Wellbutrin [bupropion]    Physical Exam  Constitutional:       Appearance: Normal appearance.   HENT:      Head: Normocephalic.   Eyes:      Conjunctiva/sclera: Conjunctivae normal.      Pupils: Pupils are equal, round, and reactive to light.   Cardiovascular:      Rate and Rhythm: Normal rate and regular rhythm.      Heart sounds: Normal heart sounds.   Pulmonary:      Effort: Pulmonary effort is normal.      Breath sounds: Normal breath sounds.   Abdominal:      Tenderness: There is no abdominal tenderness.   Musculoskeletal:         General: Normal range of motion.   Skin:     General: Skin is warm and dry.      Capillary Refill: Capillary refill takes less than 2 seconds.      Comments: Erythema/edema/warmth left wrist area, proximal to joint   Neurological:      General: No focal deficit present.      Mental Status: She is alert and oriented to person, place, and time.   Psychiatric:         Mood and Affect: Mood normal.         Behavior: Behavior normal.         Thought Content: Thought content normal.         Judgment: Judgment normal.          Physical Exam         Result Review :                Results            Assessment and Plan    Diagnoses and all orders for this visit:    1. Rash (Primary)  Comments:  Finish prednisone and apply triamcinolone. Benadryl PRN itching. Cover for infection with Keflex. RTC for worsened sx and if not improving in 2 days.  Orders:  -     cephalexin (KEFLEX) 500 MG capsule; Take 1 capsule by mouth 3 (Three) Times a Day for 7 days.  Dispense: 21 capsule; Refill: 0  -     predniSONE (DELTASONE) 20 MG tablet; 3 QD x 3 days, 2 QD x 3 days, 1 QD x 3 days  Dispense: 18 tablet; Refill: 0  -     triamcinolone (KENALOG) 0.1 % cream; Apply 1 Application topically to the appropriate area as directed 2 (Two) Times a Day.  Dispense: 30 g; Refill: 0                  Follow Up   No follow-ups on  file.  Patient was given instructions and counseling regarding her condition or for health maintenance advice. Please see specific information pulled into the AVS if appropriate.     Shani Posada APRN

## 2025-07-11 ENCOUNTER — OFFICE VISIT (OUTPATIENT)
Dept: FAMILY MEDICINE CLINIC | Facility: CLINIC | Age: 55
End: 2025-07-11
Payer: COMMERCIAL

## 2025-07-11 VITALS
HEART RATE: 92 BPM | DIASTOLIC BLOOD PRESSURE: 88 MMHG | BODY MASS INDEX: 28.41 KG/M2 | OXYGEN SATURATION: 97 % | TEMPERATURE: 100.1 F | WEIGHT: 181 LBS | SYSTOLIC BLOOD PRESSURE: 126 MMHG | HEIGHT: 67 IN

## 2025-07-11 DIAGNOSIS — R05.9 COUGH, UNSPECIFIED TYPE: ICD-10-CM

## 2025-07-11 DIAGNOSIS — J06.9 ACUTE URI: Primary | ICD-10-CM

## 2025-07-11 DIAGNOSIS — R50.9 FEVER, UNSPECIFIED FEVER CAUSE: ICD-10-CM

## 2025-07-11 LAB
EXPIRATION DATE: NORMAL
FLUAV AG UPPER RESP QL IA.RAPID: NOT DETECTED
FLUBV AG UPPER RESP QL IA.RAPID: NOT DETECTED
INTERNAL CONTROL: NORMAL
Lab: NORMAL
SARS-COV-2 AG UPPER RESP QL IA.RAPID: NOT DETECTED

## 2025-07-11 PROCEDURE — 87428 SARSCOV & INF VIR A&B AG IA: CPT | Performed by: NURSE PRACTITIONER

## 2025-07-11 PROCEDURE — 99213 OFFICE O/P EST LOW 20 MIN: CPT | Performed by: NURSE PRACTITIONER

## 2025-07-11 RX ORDER — DOXYCYCLINE 100 MG/1
100 CAPSULE ORAL 2 TIMES DAILY
Qty: 20 CAPSULE | Refills: 0 | Status: SHIPPED | OUTPATIENT
Start: 2025-07-11 | End: 2025-07-21

## 2025-07-11 RX ORDER — DEXTROMETHORPHAN HYDROBROMIDE AND PROMETHAZINE HYDROCHLORIDE 15; 6.25 MG/5ML; MG/5ML
5 SYRUP ORAL 4 TIMES DAILY PRN
Qty: 120 ML | Refills: 0 | Status: SHIPPED | OUTPATIENT
Start: 2025-07-11

## 2025-07-11 NOTE — PROGRESS NOTES
"Chief Complaint  Cough and Fever    Subjective          Michelle Perez presents to [facility]  History of Present Illness  Pt has had cough, congestion, and fever since Wednesday. She denies known sick contacts.   Cough  Associated symptoms: chills and fever    Associated symptoms: no chest pain and no shortness of breath    Fever   Associated symptoms include congestion and coughing. Pertinent negatives include no chest pain.       History of Present Illness      Objective   Vital Signs:   /88 (BP Location: Left arm)   Pulse 92   Temp 100.1 °F (37.8 °C) (Oral)   Ht 170.2 cm (67\")   Wt 82.1 kg (181 lb)   SpO2 97%   BMI 28.35 kg/m²     Body mass index is 28.35 kg/m².    Review of Systems   Constitutional:  Positive for chills and fever. Negative for fatigue.   HENT:  Positive for congestion.    Respiratory:  Positive for cough. Negative for shortness of breath.    Cardiovascular:  Negative for chest pain, palpitations and leg swelling.   Neurological:  Negative for syncope.   Psychiatric/Behavioral:  The patient is not nervous/anxious.           Current Outpatient Medications:     B Complex Vitamins (vitamin b complex) capsule capsule, Take  by mouth Daily. OTC, Disp: , Rfl:     FLUoxetine (PROzac) 20 MG capsule, Take 1 capsule by mouth Daily., Disp: 90 capsule, Rfl: 1    levothyroxine (SYNTHROID, LEVOTHROID) 25 MCG tablet, Take 1 tablet by mouth Every Morning., Disp: 30 tablet, Rfl: 5    losartan (COZAAR) 25 MG tablet, Take 1 tablet by mouth Daily., Disp: 90 tablet, Rfl: 1    triamcinolone (KENALOG) 0.1 % cream, Apply 1 Application topically to the appropriate area as directed 2 (Two) Times a Day., Disp: 30 g, Rfl: 0    valACYclovir (Valtrex) 1000 MG tablet, Take 1 tablet by mouth 3 (Three) Times a Day., Disp: 21 tablet, Rfl: 0    doxycycline (VIBRAMYCIN) 100 MG capsule, Take 1 capsule by mouth 2 (Two) Times a Day for 10 days., Disp: 20 capsule, Rfl: 0    " promethazine-dextromethorphan (PROMETHAZINE-DM) 6.25-15 MG/5ML syrup, Take 5 mL by mouth 4 (Four) Times a Day As Needed for Cough., Disp: 120 mL, Rfl: 0      Allergies: Hydrocodone-acetaminophen and Wellbutrin [bupropion]    Physical Exam  Constitutional:       Appearance: Normal appearance.   HENT:      Head: Normocephalic.      Right Ear: Tympanic membrane and ear canal normal.      Left Ear: Tympanic membrane and ear canal normal.      Nose: Nose normal.      Mouth/Throat:      Pharynx: Posterior oropharyngeal erythema present.   Eyes:      Extraocular Movements: Extraocular movements intact.      Conjunctiva/sclera: Conjunctivae normal.      Pupils: Pupils are equal, round, and reactive to light.   Cardiovascular:      Rate and Rhythm: Normal rate and regular rhythm.      Heart sounds: Normal heart sounds.   Pulmonary:      Effort: Pulmonary effort is normal. No accessory muscle usage.      Breath sounds: Normal breath sounds.   Abdominal:      Tenderness: There is no abdominal tenderness.   Musculoskeletal:         General: Normal range of motion.   Lymphadenopathy:      Cervical: No cervical adenopathy.   Skin:     General: Skin is warm and dry.      Capillary Refill: Capillary refill takes less than 2 seconds.   Neurological:      General: No focal deficit present.      Mental Status: She is alert and oriented to person, place, and time.   Psychiatric:         Mood and Affect: Mood normal.         Behavior: Behavior normal.         Thought Content: Thought content normal.         Judgment: Judgment normal.          Physical Exam         Result Review :                Results            Assessment and Plan    Diagnoses and all orders for this visit:    1. Acute URI (Primary)  Comments:  Increase fluids. Mucinex, Zyrtec, and Phen DM PRN. Fill antibiotics if not improving in 3 days. RTC for worsened sx and if not improving in 1 week.  Orders:  -     doxycycline (VIBRAMYCIN) 100 MG capsule; Take 1 capsule by  mouth 2 (Two) Times a Day for 10 days.  Dispense: 20 capsule; Refill: 0  -     promethazine-dextromethorphan (PROMETHAZINE-DM) 6.25-15 MG/5ML syrup; Take 5 mL by mouth 4 (Four) Times a Day As Needed for Cough.  Dispense: 120 mL; Refill: 0    2. Fever, unspecified fever cause  -     POCT SARS-CoV-2 + Flu Antigen LISE    3. Cough, unspecified type  -     POCT SARS-CoV-2 + Flu Antigen LISE                  Follow Up   Return in about 1 week (around 7/18/2025) for if not improving or sooner if symptoms worsen.  Patient was given instructions and counseling regarding her condition or for health maintenance advice. Please see specific information pulled into the AVS if appropriate.     HIPOLITO Rodriguez

## 2025-07-24 ENCOUNTER — OFFICE VISIT (OUTPATIENT)
Dept: CARDIOLOGY | Facility: CLINIC | Age: 55
End: 2025-07-24
Payer: COMMERCIAL

## 2025-07-24 VITALS
HEIGHT: 67 IN | OXYGEN SATURATION: 99 % | SYSTOLIC BLOOD PRESSURE: 124 MMHG | RESPIRATION RATE: 18 BRPM | BODY MASS INDEX: 28.09 KG/M2 | HEART RATE: 82 BPM | DIASTOLIC BLOOD PRESSURE: 74 MMHG | WEIGHT: 179 LBS

## 2025-07-24 DIAGNOSIS — E78.2 MIXED HYPERLIPIDEMIA: ICD-10-CM

## 2025-07-24 DIAGNOSIS — I10 BENIGN ESSENTIAL HTN: ICD-10-CM

## 2025-07-24 DIAGNOSIS — R06.09 DYSPNEA ON EXERTION: Primary | ICD-10-CM

## 2025-07-24 PROCEDURE — 99214 OFFICE O/P EST MOD 30 MIN: CPT | Performed by: INTERNAL MEDICINE

## 2025-07-24 RX ORDER — ROSUVASTATIN CALCIUM 5 MG/1
5 TABLET, COATED ORAL DAILY
Qty: 30 TABLET | Refills: 2 | Status: SHIPPED | OUTPATIENT
Start: 2025-07-24

## 2025-07-24 NOTE — PROGRESS NOTES
[facility]  [facility]  [address] [place] [address]  Dept: [phone]  Dept Fax: [phone]    Date: 07/24/2025  Patient: Michelle Perez  YOB: 1970    Follow Up Office Visit Note    Interval Follow-up  Ms. Michelle Perez is a 55 y.o. female who is here for follow-up on Follow-up (Testing ).    Subjective     History of Present Illness  The patient presents for a follow-up visit.    She reports no current chest pain or issues. She has been making efforts to lower her cholesterol levels independently. In the past, she was diagnosed with high cholesterol and was prescribed Lipitor. However, she experienced body aches and pains as side effects, leading her to discontinue the medication. Subsequently, she improved her diet, which resulted in a decrease in her cholesterol levels. She did not encounter any further issues until recently.  Patient denies angina, orthopnea, PND, palpitations, lightheadedness, syncope or medications side-effects.    SOCIAL HISTORY  Diet: The patient reports eating better in the past to lower cholesterol.    The following portions of the patient's history were reviewed and updated as appropriate: allergies, current medications, past family history, past medical history, past social history, past surgical history, and problem list.    Medications:   Allergies   Allergen Reactions    Hydrocodone-Acetaminophen Hives, Itching, Swelling and Unknown - Low Severity    Wellbutrin [Bupropion] Hives    Atorvastatin Other (See Comments)     Joint Pain      Current Outpatient Medications   Medication Instructions    B Complex Vitamins (vitamin b complex) capsule capsule Daily    FLUoxetine (PROZAC) 20 mg, Oral, Daily    levothyroxine (SYNTHROID, LEVOTHROID) 25 mcg, Oral, Every Morning    losartan (COZAAR) 25 mg, Oral, Daily    promethazine-dextromethorphan (PROMETHAZINE-DM) 6.25-15 MG/5ML syrup 5 mL, Oral, 4 Times Daily PRN    rosuvastatin  "(CRESTOR) 5 mg, Oral, Daily    triamcinolone (KENALOG) 0.1 % cream 1 Application, Topical, 2 Times Daily    valACYclovir (VALTREX) 1,000 mg, Oral, 3 Times Daily       Tobacco Use: Low Risk  (7/24/2025)    Patient History     Smoking Tobacco Use: Never     Smokeless Tobacco Use: Never     Passive Exposure: Never        Objective  Vitals:    07/24/25 1112   BP: 124/74   BP Location: Right arm   Patient Position: Sitting   Cuff Size: Adult   Pulse: 82   Resp: 18   SpO2: 99%   Weight: 81.2 kg (179 lb)   Height: 170.2 cm (67\")   PainSc: 0-No pain      Vitals:    07/24/25 1112   BP: 124/74   BP Location: Right arm   Patient Position: Sitting   Cuff Size: Adult   Pulse: 82   Resp: 18   SpO2: 99%   Weight: 81.2 kg (179 lb)   Height: 170.2 cm (67\")          Physical Exam  Constitutional:       Appearance: Not in distress.   Neck:      Vascular: JVD normal.   Pulmonary:      Breath sounds: Normal breath sounds.   Cardiovascular:      Normal rate. Regular rhythm.      Murmurs: There is no murmur.   Pulses:     Intact distal pulses.   Edema:     Peripheral edema absent.   Neurological:      Mental Status: Alert.              Diagnostic Data  Lab Results   Component Value Date     05/20/2025    K 4.4 05/20/2025     (H) 05/20/2025    CO2 23 05/20/2025    MG 2.2 05/20/2025    BUN 25 (H) 05/20/2025    CREATININE 0.82 05/20/2025    CALCIUM 9.2 05/20/2025    BILITOT 0.3 05/20/2025    ALKPHOS 111 05/20/2025    ALT 11 05/20/2025    AST 12 05/20/2025    GLUCOSE 95 05/20/2025    ALBUMIN 4.1 05/20/2025     Lab Results   Component Value Date    WBC 5.7 04/03/2025    HGB 12.4 04/03/2025    HCT 38.4 04/03/2025     04/03/2025     No results found for: \"APTT\", \"INR\", \"PTT\"  No results found for: \"CKTOTAL\", \"TROPONINI\", \"TROPONINT\", \"CKMBINDEX\", \"BNP\"  No results found for: \"BNP\", \"PROBNP\"    Lab Results   Component Value Date    CHLPL 218 (H) 04/03/2025    TRIG 216 (H) 04/03/2025    HDL 56 04/03/2025     (H) " 04/03/2025     Lab Results   Component Value Date    TSH 5.540 (H) 04/03/2025    FREET4 0.82 04/03/2025       CV Diagnostics:  Procedures  CXR: No results found for this or any previous visit.     ECHO/MUGA: Results for orders placed in visit on 04/29/25    Adult Transthoracic Echo Complete W/ Cont if Necessary Per Protocol    Interpretation Summary    Left ventricular systolic function is normal. Calculated left ventricular EF = 62.3% Left ventricular ejection fraction appears to be 61 - 65%.    Left ventricular wall thickness is consistent with mild concentric hypertrophy.    The left atrial cavity is mildly dilated.    Left ventricular diastolic function is consistent with (grade I) impaired relaxation.    Estimated right ventricular systolic pressure from tricuspid regurgitation is normal (<35 mmHg). Calculated right ventricular systolic pressure from tricuspid regurgitation is 23 mmHg.     STRESS TESTS: Results for orders placed in visit on 04/09/25    Stress Test With Myocardial Perfusion One Day    Interpretation Summary    Impressions are consistent with a low risk treadmill nuclear stress study.    Myocardial perfusion imaging indicates a normal myocardial perfusion study.  There was a mild severity very small defect at rest in the apical lateral wall, with nonsignificant worsening on stress images prone/supine, associated with normal regional wall motion, suggesting anterior wall attenuation artifact.    Left ventricular ejection fraction is normal (Calculated EF = 59%).  No regional wall motion abnormalities detected.  TID 0.98.    Moderate risk for ischemic heart disease by [facility] treadmill score.    Breast attenuation and GI artifacts are present.     CARDIAC CATH: No results found for this or any previous visit.     DEVICES: No valid procedures specified.   HOLTER: No results found for this or any previous visit.     CT/MRI:  No results found for this or any previous visit.    VASCULAR: No valid  procedures specified.     Assessment and Plan  Diagnoses and all orders for this visit:    1. Dyspnea on exertion (Primary)  Assessment & Plan:  Significantly improved with blood pressure control.  No recurrence of chest pain or shortness of breath.  Nuclear stress test low risk.  Transthoracic echocardiogram normal EF, mild LVH, mild LAE, grade 1 diastolic dysfunction suggestive hypertensive cardiomyopathy. Plan:  Continue losartan 25 mg p.o. daily for blood pressure control  Patient counseled in regards to heart healthy, low fat/ low cholesterol/low sat diet, daily exercise for 30 minutes, low to moderate intensity, and weight loss.      2. Benign essential HTN  Assessment & Plan:  Hypertension is well-controlled.  Continue losartan 25 mg p.o. daily.  Dietary sodium restriction.  Weight loss.  Regular aerobic exercise.  Ambulatory blood pressure monitoring.  Blood pressure will be reassessed in 6 months in office.      3. Mixed hyperlipidemia  Assessment & Plan:   Lipid abnormalities are newly identified  ASCVD Score: 2.5%    Plan:  Begin taking the following medication/s; rosuvastatin 5 mg p.o. daily.      Discussed medication dosage, use, side effects, and goals of treatment in detail.    Counseled patient on lifestyle modifications to help control hyperlipidemia.   Cholesterol lowering dietary information shared with patient.  Advised patient to exercise for 150 minutes weekly. (30 minute brisk walk, 5 days a week for example)  Weight Loss encouraged  Patient counseled in regards to heart healthy, low fat/ low cholesterol/low sat diet, daily exercise for 30 minutes, low to moderate intensity, and weight loss.    Lab Results   Component Value Date    CHLPL 218 (H) 04/03/2025    CHLPL 219 (H) 09/25/2024    CHLPL 201 (H) 03/25/2024    TRIG 216 (H) 04/03/2025    TRIG 473 (H) 09/25/2024    TRIG 155 (H) 03/25/2024    HDL 56 04/03/2025    HDL 46 09/25/2024    HDL 61 03/25/2024     (H) 04/03/2025    LDL 95  09/25/2024     (H) 03/25/2024   ASCVD Score: 2.2%    Goal of therapy: LDL  mg/dL  LDL not to goal despite therapeutic lifestyle changes, shared decision making to try another statin rosuvastatin 5 mg p.o. daily    Followup in 6 months.      Other orders  -     rosuvastatin (CRESTOR) 5 MG tablet; Take 1 tablet by mouth Daily.  Dispense: 30 tablet; Refill: 2         Return in about 6 months (around 1/24/2026) for Follow-up with Dr Henderson.    There are no Patient Instructions on file for this visit.    Patient or patient representative verbalized consent for the use of Ambient Listening during the visit with  Darrius Henderson MD for chart documentation. 7/24/2025  11:50 EDT    Darrius Henderson MD

## 2025-07-24 NOTE — ASSESSMENT & PLAN NOTE
Lipid abnormalities are newly identified  ASCVD Score: 2.5%    Plan:  Begin taking the following medication/s; rosuvastatin 5 mg p.o. daily.      Discussed medication dosage, use, side effects, and goals of treatment in detail.    Counseled patient on lifestyle modifications to help control hyperlipidemia.   Cholesterol lowering dietary information shared with patient.  Advised patient to exercise for 150 minutes weekly. (30 minute brisk walk, 5 days a week for example)  Weight Loss encouraged  Patient counseled in regards to heart healthy, low fat/ low cholesterol/low sat diet, daily exercise for 30 minutes, low to moderate intensity, and weight loss.    Lab Results   Component Value Date    CHLPL 218 (H) 04/03/2025    CHLPL 219 (H) 09/25/2024    CHLPL 201 (H) 03/25/2024    TRIG 216 (H) 04/03/2025    TRIG 473 (H) 09/25/2024    TRIG 155 (H) 03/25/2024    HDL 56 04/03/2025    HDL 46 09/25/2024    HDL 61 03/25/2024     (H) 04/03/2025    LDL 95 09/25/2024     (H) 03/25/2024   ASCVD Score: 2.2%    Goal of therapy: LDL  mg/dL  LDL not to goal despite therapeutic lifestyle changes, shared decision making to try another statin rosuvastatin 5 mg p.o. daily    Followup in 6 months.

## 2025-07-24 NOTE — ASSESSMENT & PLAN NOTE
Hypertension is well-controlled.  Continue losartan 25 mg p.o. daily.  Dietary sodium restriction.  Weight loss.  Regular aerobic exercise.  Ambulatory blood pressure monitoring.  Blood pressure will be reassessed in 6 months in office.

## 2025-07-24 NOTE — ASSESSMENT & PLAN NOTE
Significantly improved with blood pressure control.  No recurrence of chest pain or shortness of breath.  Nuclear stress test low risk.  Transthoracic echocardiogram normal EF, mild LVH, mild LAE, grade 1 diastolic dysfunction suggestive hypertensive cardiomyopathy. Plan:  Continue losartan 25 mg p.o. daily for blood pressure control  Patient counseled in regards to heart healthy, low fat/ low cholesterol/low sat diet, daily exercise for 30 minutes, low to moderate intensity, and weight loss.